# Patient Record
Sex: FEMALE | Race: OTHER | HISPANIC OR LATINO | URBAN - METROPOLITAN AREA
[De-identification: names, ages, dates, MRNs, and addresses within clinical notes are randomized per-mention and may not be internally consistent; named-entity substitution may affect disease eponyms.]

---

## 2019-03-24 ENCOUNTER — EMERGENCY (EMERGENCY)
Facility: HOSPITAL | Age: 29
LOS: 1 days | Discharge: HOME | End: 2019-03-24
Attending: EMERGENCY MEDICINE
Payer: MEDICAID

## 2019-03-24 VITALS
OXYGEN SATURATION: 100 % | HEART RATE: 86 BPM | SYSTOLIC BLOOD PRESSURE: 103 MMHG | RESPIRATION RATE: 18 BRPM | TEMPERATURE: 98 F | DIASTOLIC BLOOD PRESSURE: 59 MMHG | HEIGHT: 65 IN | WEIGHT: 125 LBS

## 2019-03-24 LAB
ALBUMIN SERPL ELPH-MCNC: 4.5 G/DL — SIGNIFICANT CHANGE UP (ref 3.3–5.2)
ALP SERPL-CCNC: 66 U/L — SIGNIFICANT CHANGE UP (ref 40–120)
ALT FLD-CCNC: 7 U/L — SIGNIFICANT CHANGE UP
ANION GAP SERPL CALC-SCNC: 12 MMOL/L — SIGNIFICANT CHANGE UP (ref 5–17)
APPEARANCE UR: CLEAR — SIGNIFICANT CHANGE UP
AST SERPL-CCNC: 10 U/L — SIGNIFICANT CHANGE UP
BASOPHILS # BLD AUTO: 0 K/UL — SIGNIFICANT CHANGE UP (ref 0–0.2)
BASOPHILS NFR BLD AUTO: 0.2 % — SIGNIFICANT CHANGE UP (ref 0–2)
BILIRUB SERPL-MCNC: 0.4 MG/DL — SIGNIFICANT CHANGE UP (ref 0.4–2)
BILIRUB UR-MCNC: NEGATIVE — SIGNIFICANT CHANGE UP
BUN SERPL-MCNC: 5 MG/DL — LOW (ref 8–20)
CALCIUM SERPL-MCNC: 9.4 MG/DL — SIGNIFICANT CHANGE UP (ref 8.6–10.2)
CHLORIDE SERPL-SCNC: 102 MMOL/L — SIGNIFICANT CHANGE UP (ref 98–107)
CO2 SERPL-SCNC: 23 MMOL/L — SIGNIFICANT CHANGE UP (ref 22–29)
COLOR SPEC: YELLOW — SIGNIFICANT CHANGE UP
CREAT SERPL-MCNC: 0.42 MG/DL — LOW (ref 0.5–1.3)
DIFF PNL FLD: ABNORMAL
EOSINOPHIL # BLD AUTO: 0.2 K/UL — SIGNIFICANT CHANGE UP (ref 0–0.5)
EOSINOPHIL NFR BLD AUTO: 1.5 % — SIGNIFICANT CHANGE UP (ref 0–6)
GLUCOSE SERPL-MCNC: 74 MG/DL — SIGNIFICANT CHANGE UP (ref 70–115)
GLUCOSE UR QL: NEGATIVE MG/DL — SIGNIFICANT CHANGE UP
HCG SERPL-ACNC: HIGH MIU/ML
HCG UR QL: POSITIVE
HCT VFR BLD CALC: 35.2 % — LOW (ref 37–47)
HGB BLD-MCNC: 12.4 G/DL — SIGNIFICANT CHANGE UP (ref 12–16)
KETONES UR-MCNC: NEGATIVE — SIGNIFICANT CHANGE UP
LEUKOCYTE ESTERASE UR-ACNC: ABNORMAL
LYMPHOCYTES # BLD AUTO: 2.8 K/UL — SIGNIFICANT CHANGE UP (ref 1–4.8)
LYMPHOCYTES # BLD AUTO: 23.8 % — SIGNIFICANT CHANGE UP (ref 20–55)
MCHC RBC-ENTMCNC: 30.9 PG — SIGNIFICANT CHANGE UP (ref 27–31)
MCHC RBC-ENTMCNC: 35.2 G/DL — SIGNIFICANT CHANGE UP (ref 32–36)
MCV RBC AUTO: 87.8 FL — SIGNIFICANT CHANGE UP (ref 81–99)
MONOCYTES # BLD AUTO: 1 K/UL — HIGH (ref 0–0.8)
MONOCYTES NFR BLD AUTO: 8.4 % — SIGNIFICANT CHANGE UP (ref 3–10)
NEUTROPHILS # BLD AUTO: 7.6 K/UL — SIGNIFICANT CHANGE UP (ref 1.8–8)
NEUTROPHILS NFR BLD AUTO: 65.8 % — SIGNIFICANT CHANGE UP (ref 37–73)
NITRITE UR-MCNC: NEGATIVE — SIGNIFICANT CHANGE UP
PH UR: 7 — SIGNIFICANT CHANGE UP (ref 5–8)
PLATELET # BLD AUTO: 286 K/UL — SIGNIFICANT CHANGE UP (ref 150–400)
POTASSIUM SERPL-MCNC: 4.1 MMOL/L — SIGNIFICANT CHANGE UP (ref 3.5–5.3)
POTASSIUM SERPL-SCNC: 4.1 MMOL/L — SIGNIFICANT CHANGE UP (ref 3.5–5.3)
PROT SERPL-MCNC: 8.1 G/DL — SIGNIFICANT CHANGE UP (ref 6.6–8.7)
PROT UR-MCNC: 15 MG/DL
RBC # BLD: 4.01 M/UL — LOW (ref 4.4–5.2)
RBC # FLD: 12.5 % — SIGNIFICANT CHANGE UP (ref 11–15.6)
SODIUM SERPL-SCNC: 137 MMOL/L — SIGNIFICANT CHANGE UP (ref 135–145)
SP GR SPEC: 1 — LOW (ref 1.01–1.02)
TYPE + AB SCN PNL BLD: SIGNIFICANT CHANGE UP
UROBILINOGEN FLD QL: NEGATIVE MG/DL — SIGNIFICANT CHANGE UP
WBC # BLD: 11.6 K/UL — HIGH (ref 4.8–10.8)
WBC # FLD AUTO: 11.6 K/UL — HIGH (ref 4.8–10.8)

## 2019-03-24 PROCEDURE — 76801 OB US < 14 WKS SINGLE FETUS: CPT

## 2019-03-24 PROCEDURE — 86850 RBC ANTIBODY SCREEN: CPT

## 2019-03-24 PROCEDURE — 36415 COLL VENOUS BLD VENIPUNCTURE: CPT

## 2019-03-24 PROCEDURE — 81001 URINALYSIS AUTO W/SCOPE: CPT

## 2019-03-24 PROCEDURE — 99284 EMERGENCY DEPT VISIT MOD MDM: CPT

## 2019-03-24 PROCEDURE — 80053 COMPREHEN METABOLIC PANEL: CPT

## 2019-03-24 PROCEDURE — 84702 CHORIONIC GONADOTROPIN TEST: CPT

## 2019-03-24 PROCEDURE — 76801 OB US < 14 WKS SINGLE FETUS: CPT | Mod: 26

## 2019-03-24 PROCEDURE — 86901 BLOOD TYPING SEROLOGIC RH(D): CPT

## 2019-03-24 PROCEDURE — 93010 ELECTROCARDIOGRAM REPORT: CPT

## 2019-03-24 PROCEDURE — 86900 BLOOD TYPING SEROLOGIC ABO: CPT

## 2019-03-24 PROCEDURE — 81025 URINE PREGNANCY TEST: CPT

## 2019-03-24 PROCEDURE — 93005 ELECTROCARDIOGRAM TRACING: CPT

## 2019-03-24 PROCEDURE — 85027 COMPLETE CBC AUTOMATED: CPT

## 2019-03-24 PROCEDURE — T1013: CPT

## 2019-03-24 RX ORDER — ACETAMINOPHEN 500 MG
650 TABLET ORAL ONCE
Qty: 0 | Refills: 0 | Status: COMPLETED | OUTPATIENT
Start: 2019-03-24 | End: 2019-03-24

## 2019-03-24 RX ADMIN — Medication 650 MILLIGRAM(S): at 19:40

## 2019-03-24 NOTE — ED STATDOCS - CLINICAL SUMMARY MEDICAL DECISION MAKING FREE TEXT BOX
Pregnant F, , abdominal pain, with suprapubic tenderness on exma, with slight dysuria, check UA and r/o UTI, obtain labs including HCG, and US to confirm IUP. Pregnant F, , abdominal pain, with suprapubic tenderness on exam. Mild dysuria reported will check UA and r/o UTI, obtain labs including HCG, and US to confirm IUP.

## 2019-03-24 NOTE — ED STATDOCS - PROGRESS NOTE DETAILS
RADHA SANDOVAL: PT evaluated by intake physician. HPI/PE/ROS as noted above. Will follow up plan per intake physician   educated on results of sono and blood work and proper fu and percautions for abdominal pain during pregnancy.   pending EKG EKG normal DC with FU with [facility]

## 2019-03-24 NOTE — ED STATDOCS - NS_ ATTENDINGSCRIBEDETAILS _ED_A_ED_FT
I, Taya Moncada, performed the initial face to face bedside interview with this patient regarding history of present illness, review of symptoms and relevant past medical, social and family history.  I completed an independent physical examination.  I was the provider who initially evaluated this patient.  The history, relevant review of systems, past medical and surgical history, medical decision making, and physical examination was documented by the scribe in my presence and I attest to the accuracy of the documentation. Follow-up on ordered tests (ie labs, radiologic studies) and re-evaluation of the patient's status has been communicated to the ACP.  Disposition of the patient will be based on test outcome and response to ED interventions.

## 2019-03-24 NOTE — ED STATDOCS - OBJECTIVE STATEMENT
29 y/o F pt with no pert. hx presents to the ED c/o constant lower abdominal pain with assoc. subjective fever, dysuria, nausea, chills, and decreased PO intake for 3 days. Pt notes chest discomfort when she moves from lying down to sitting up. Pt rates her pain 9/10. Pt is currently pregnant, has not seen an OB/GYN. A0. Pt's last BM was yesterday. Pt [place] came into the [place] in [place]. Pt hasn't been taking any medication for her pain. Denies vaginal bleeding and hematuria. No further complaints at this time.   PMD: None  LMP:    : Paulina

## 2019-04-10 PROBLEM — Z00.00 ENCOUNTER FOR PREVENTIVE HEALTH EXAMINATION: Status: ACTIVE | Noted: 2019-04-10

## 2019-04-25 ENCOUNTER — APPOINTMENT (OUTPATIENT)
Age: 29
End: 2019-04-25
Payer: MEDICAID

## 2019-04-25 ENCOUNTER — ASOB RESULT (OUTPATIENT)
Age: 29
End: 2019-04-25

## 2019-04-25 PROCEDURE — 76813 OB US NUCHAL MEAS 1 GEST: CPT

## 2019-04-25 PROCEDURE — ZZZZZ: CPT

## 2019-04-25 PROCEDURE — 76801 OB US < 14 WKS SINGLE FETUS: CPT

## 2019-04-25 PROCEDURE — 36416 COLLJ CAPILLARY BLOOD SPEC: CPT

## 2019-04-29 LAB
1ST TRIMESTER DATA: NORMAL
ADDENDUM DOC: NORMAL
AFP PNL SERPL: NORMAL
AFP SERPL-ACNC: NORMAL
CLINICAL BIOCHEMIST REVIEW: ABNORMAL
FREE BETA HCG 1ST TRIMESTER: NORMAL
Lab: NORMAL
NOTES NTD: NORMAL
NT: NORMAL
PAPP-A SERPL-ACNC: NORMAL
TRISOMY 18/3: NORMAL

## 2019-06-03 ENCOUNTER — APPOINTMENT (OUTPATIENT)
Dept: ANTEPARTUM | Facility: CLINIC | Age: 29
End: 2019-06-03
Payer: MEDICAID

## 2019-06-03 ENCOUNTER — ASOB RESULT (OUTPATIENT)
Age: 29
End: 2019-06-03

## 2019-06-03 ENCOUNTER — APPOINTMENT (OUTPATIENT)
Dept: MATERNAL FETAL MEDICINE | Facility: CLINIC | Age: 29
End: 2019-06-03
Payer: MEDICAID

## 2019-06-03 VITALS
RESPIRATION RATE: 16 BRPM | HEIGHT: 62 IN | DIASTOLIC BLOOD PRESSURE: 56 MMHG | HEART RATE: 71 BPM | SYSTOLIC BLOOD PRESSURE: 90 MMHG | BODY MASS INDEX: 21.44 KG/M2 | OXYGEN SATURATION: 98 % | WEIGHT: 116.5 LBS

## 2019-06-03 DIAGNOSIS — Z3A.17 17 WEEKS GESTATION OF PREGNANCY: ICD-10-CM

## 2019-06-03 DIAGNOSIS — O28.0 ABNORMAL HEMATOLOGICAL FINDING ON ANTENATAL SCREENING OF MOTHER: ICD-10-CM

## 2019-06-03 DIAGNOSIS — O09.899 ABNORMAL HEMATOLOGICAL FINDING ON ANTENATAL SCREENING OF MOTHER: ICD-10-CM

## 2019-06-03 DIAGNOSIS — O28.9 UNSPECIFIED ABNORMAL FINDINGS ON ANTENATAL SCREENING OF MOTHER: ICD-10-CM

## 2019-06-03 DIAGNOSIS — J30.1 ALLERGIC RHINITIS DUE TO POLLEN: ICD-10-CM

## 2019-06-03 DIAGNOSIS — Z83.3 FAMILY HISTORY OF DIABETES MELLITUS: ICD-10-CM

## 2019-06-03 PROCEDURE — 99205 OFFICE O/P NEW HI 60 MIN: CPT | Mod: TH

## 2019-06-03 PROCEDURE — 76805 OB US >/= 14 WKS SNGL FETUS: CPT

## 2019-06-03 RX ORDER — VITAMIN C, CALCIUM, IRON, VITAMIN D3, VITAMIN E, VITAMIN B1, VITAMIN B2, VITAMIN B3, VITAMIN B6, FOLIC ACID, IODINE, ZINC, COPPER, DOCUSATE SODIUM, DOCOSAHEXAENOIC ACID (DHA) 27-1-50 MG
KIT ORAL
Refills: 0 | Status: ACTIVE | COMMUNITY

## 2019-06-03 NOTE — DISCUSSION/SUMMARY
[FreeTextEntry1] : Level 2 sonogram is scheduled in 3 weeks. \par \par Genetic counseling is manuel scheduled to review the results of her genetic screening.

## 2019-06-03 NOTE — OB HISTORY
[Pregnancy History] : patient did not receive anesthesia [___] : no pregnancy complications reported [LMP: ___] : LMP: [unfilled] [BRYAN: ___] : BRYAN: [unfilled] [EGA: ___ wks] : EGA: [unfilled] wks [at ___ wks] : at [unfilled] weeks [Spontaneous] : Spontaneous conception [Sonogram] : sonogram [Definite:  ___ (Date)] : the last menstrual period was [unfilled]

## 2019-06-03 NOTE — PAST MEDICAL HISTORY
[HIV Infection] : no HIV [Syphilis] : no syphilis [Exposure To Gonorrhea] : no gonorrhea [Chlamydial Infections] : no chlamydia [Herpes Simplex] : no genital herpes [Human Papilloma Virus Infection] : no genital warts [Hepatitis, B Virus] : no Hepatitis B [Trichomoniasis] : no trichomoniasis [Hepatitis, C Virus] : no Hepatitis C

## 2019-06-03 NOTE — OB HISTORY
[Pregnancy History] : patient did not receive anesthesia [___] : no pregnancy complications reported [LMP: ___] : LMP: [unfilled] [BRYAN: ___] : BRYAN: [unfilled] [EGA: ___ wks] : EGA: [unfilled] wks [Spontaneous] : Spontaneous conception [Sonogram] : sonogram [at ___ wks] : at [unfilled] weeks [Definite:  ___ (Date)] : the last menstrual period was [unfilled]

## 2019-06-03 NOTE — CHIEF COMPLAINT
[G ___] : G [unfilled] [P ___] : P [unfilled] [de-identified] : low JACKY A and increased risk for Down Syndrome on first trimester screen

## 2019-06-03 NOTE — SURGICAL HISTORY
[Breast Disease] : no breast disease [Abn Paps] : no abnormal pap smears [Fibroids] : no fibroids [STI's] : no STI's [Cysts] : no cysts [Infertility] : no infertility [Last Pap: ___] : Last Pap: [unfilled] [OC Use] : no OC use [Last Mammo: ___] : Last Mammo: [unfilled]

## 2019-06-03 NOTE — VITALS
[LMP (date): ___] : LMP was on [unfilled] [GA =___ Weeks] : which calculates to a GA of [unfilled] weeks [GA= ___ Days] : and [unfilled] day(s) [BRYAN by LMP (date): ___] : The calculated BRYAN by LMP is [unfilled] [By LMP] : this is the final BRYAN

## 2019-06-03 NOTE — ACTIVE PROBLEMS
[Diabetes Mellitus] : no diabetes mellitus [Heart Disease] : no heart disease [Hypertension] : no hypertension [Renal Disease] : no kidney disease, no UTI [Autoimmune Disease] : no autoimmune disease [Neurologic Disorder] : no neurologic disorder, no epilepsy [Psychiatric Disorders] : no psychiatric disorders [Depression] : no depression, no post partum depression [Hepatic Disorder] : no hepatitis, no liver disease [Thyroid Disorder] : no thyroid dysfunction [Thrombophlebitis] : no varicosities, no phlebitis [Trauma] : no trauma/violence [Blood Transfusion (___ Ml)] : no history of blood transfusion

## 2019-06-03 NOTE — FAMILY HISTORY
[Age 35+ During Pregnancy] : not 35 or over during pregnancy [Reported Family History Of Birth Defects] : no neural tube defect [Alex-Sachs Carrier] : no Alex-Sachs [Family History] : no mental retardation/autism [Reported Family History Of Genetic Disease] : no history of child defect in child of baby father

## 2019-06-03 NOTE — ACTIVE PROBLEMS
[Diabetes Mellitus] : no diabetes mellitus [Heart Disease] : no heart disease [Hypertension] : no hypertension [Autoimmune Disease] : no autoimmune disease [Renal Disease] : no kidney disease, no UTI [Neurologic Disorder] : no neurologic disorder, no epilepsy [Depression] : no depression, no post partum depression [Psychiatric Disorders] : no psychiatric disorders [Thyroid Disorder] : no thyroid dysfunction [Thrombophlebitis] : no varicosities, no phlebitis [Trauma] : no trauma/violence [Hepatic Disorder] : no hepatitis, no liver disease [Blood Transfusion (___ Ml)] : no history of blood transfusion

## 2019-06-03 NOTE — DATA REVIEWED
[FreeTextEntry1] : Mita reports she has not had genetic counseling, however states she had a blood test that told her the genetics are normal and this is a male fetus. We could not find any record of a NIPS test being performed. \par \par Mita was counseled regarding the risks of JACKY A at the 1%, including increased risks of low birth weight,  delivery, fetal loss, and preeclampsia. \par \par She understands the need for incresed monitoring and will begin baby aspirin therapy.

## 2019-06-03 NOTE — CHIEF COMPLAINT
[G ___] : G [unfilled] [P ___] : P [unfilled] [de-identified] : low JACKY A and increased risk for Down Syndrome on first trimester screen

## 2019-06-03 NOTE — SURGICAL HISTORY
[Fibroids] : no fibroids [Abn Paps] : no abnormal pap smears [Breast Disease] : no breast disease [Cysts] : no cysts [STI's] : no STI's [Infertility] : no infertility [OC Use] : no OC use [Last Pap: ___] : Last Pap: [unfilled] [Last Mammo: ___] : Last Mammo: [unfilled]

## 2019-06-03 NOTE — PAST MEDICAL HISTORY
[HIV Infection] : no HIV [Chlamydial Infections] : no chlamydia [Syphilis] : no syphilis [Exposure To Gonorrhea] : no gonorrhea [Human Papilloma Virus Infection] : no genital warts [Hepatitis, B Virus] : no Hepatitis B [Herpes Simplex] : no genital herpes [Trichomoniasis] : no trichomoniasis [Hepatitis, C Virus] : no Hepatitis C

## 2019-06-03 NOTE — FAMILY HISTORY
[Age 35+ During Pregnancy] : not 35 or over during pregnancy [Reported Family History Of Birth Defects] : no congenital heart defects [Alex-Sachs Carrier] : no Alex-Sachs [Family History] : no mental retardation/autism [Reported Family History Of Genetic Disease] : no history of child defect in child of baby father

## 2019-06-03 NOTE — VITALS
[GA =___ Weeks] : which calculates to a GA of [unfilled] weeks [LMP (date): ___] : LMP was on [unfilled] [GA= ___ Days] : and [unfilled] day(s) [BRYAN by LMP (date): ___] : The calculated BRYAN by LMP is [unfilled] [By LMP] : this is the final BRYAN

## 2019-06-11 ENCOUNTER — ASOB RESULT (OUTPATIENT)
Age: 29
End: 2019-06-11

## 2019-06-11 ENCOUNTER — APPOINTMENT (OUTPATIENT)
Dept: MATERNAL FETAL MEDICINE | Facility: CLINIC | Age: 29
End: 2019-06-11
Payer: MEDICAID

## 2019-06-11 DIAGNOSIS — O35.1XX0 MATERNAL CARE FOR (SUSPECTED) CHROMOSOMAL ABNORMALITY IN FETUS, NOT APPLICABLE OR UNSPECIFIED: ICD-10-CM

## 2019-06-11 PROCEDURE — 99213 OFFICE O/P EST LOW 20 MIN: CPT | Mod: TH

## 2019-06-15 LAB
1ST TRIMESTER DATA: NORMAL
2ND TRIMESTER DATA: NORMAL
ADDENDUM DOC: NORMAL
AFP PNL SERPL: NORMAL
AFP SERPL-ACNC: NORMAL
AFP SERPL-ACNC: NORMAL
B-HCG FREE SERPL-MCNC: NORMAL
CLINICAL BIOCHEMIST REVIEW: NORMAL
FREE BETA HCG 1ST TRIMESTER: NORMAL
INHIBIN A SERPL-MCNC: NORMAL
NOTES NTD: NORMAL
NT: NORMAL
PAPP-A SERPL-ACNC: NORMAL
U ESTRIOL SERPL-SCNC: NORMAL

## 2019-06-19 ENCOUNTER — APPOINTMENT (OUTPATIENT)
Age: 29
End: 2019-06-19
Payer: MEDICAID

## 2019-06-19 ENCOUNTER — ASOB RESULT (OUTPATIENT)
Age: 29
End: 2019-06-19

## 2019-06-19 PROCEDURE — 76811 OB US DETAILED SNGL FETUS: CPT

## 2019-06-19 PROCEDURE — 76817 TRANSVAGINAL US OBSTETRIC: CPT

## 2019-07-01 ENCOUNTER — APPOINTMENT (OUTPATIENT)
Dept: MATERNAL FETAL MEDICINE | Facility: CLINIC | Age: 29
End: 2019-07-01

## 2019-07-01 ENCOUNTER — APPOINTMENT (OUTPATIENT)
Dept: ANTEPARTUM | Facility: CLINIC | Age: 29
End: 2019-07-01

## 2019-07-17 ENCOUNTER — APPOINTMENT (OUTPATIENT)
Dept: ANTEPARTUM | Facility: CLINIC | Age: 29
End: 2019-07-17
Payer: MEDICAID

## 2019-07-17 ENCOUNTER — ASOB RESULT (OUTPATIENT)
Age: 29
End: 2019-07-17

## 2019-07-17 PROCEDURE — 76820 UMBILICAL ARTERY ECHO: CPT

## 2019-07-17 PROCEDURE — 93976 VASCULAR STUDY: CPT

## 2019-07-17 PROCEDURE — 76816 OB US FOLLOW-UP PER FETUS: CPT

## 2019-08-14 ENCOUNTER — APPOINTMENT (OUTPATIENT)
Dept: ANTEPARTUM | Facility: CLINIC | Age: 29
End: 2019-08-14
Payer: MEDICAID

## 2019-08-14 ENCOUNTER — ASOB RESULT (OUTPATIENT)
Age: 29
End: 2019-08-14

## 2019-08-14 PROCEDURE — 76820 UMBILICAL ARTERY ECHO: CPT

## 2019-08-14 PROCEDURE — 93976 VASCULAR STUDY: CPT

## 2019-08-14 PROCEDURE — 76816 OB US FOLLOW-UP PER FETUS: CPT

## 2019-09-11 ENCOUNTER — APPOINTMENT (OUTPATIENT)
Dept: ANTEPARTUM | Facility: CLINIC | Age: 29
End: 2019-09-11
Payer: MEDICAID

## 2019-09-11 ENCOUNTER — ASOB RESULT (OUTPATIENT)
Age: 29
End: 2019-09-11

## 2019-09-11 PROCEDURE — 76816 OB US FOLLOW-UP PER FETUS: CPT

## 2019-09-11 PROCEDURE — 76819 FETAL BIOPHYS PROFIL W/O NST: CPT

## 2019-09-11 PROCEDURE — 76820 UMBILICAL ARTERY ECHO: CPT

## 2019-09-11 PROCEDURE — 93976 VASCULAR STUDY: CPT

## 2019-10-09 ENCOUNTER — APPOINTMENT (OUTPATIENT)
Dept: ANTEPARTUM | Facility: CLINIC | Age: 29
End: 2019-10-09
Payer: MEDICAID

## 2019-10-09 ENCOUNTER — ASOB RESULT (OUTPATIENT)
Age: 29
End: 2019-10-09

## 2019-10-09 PROCEDURE — 76816 OB US FOLLOW-UP PER FETUS: CPT

## 2019-11-04 ENCOUNTER — TRANSCRIPTION ENCOUNTER (OUTPATIENT)
Age: 29
End: 2019-11-04

## 2019-11-04 ENCOUNTER — INPATIENT (INPATIENT)
Facility: HOSPITAL | Age: 29
LOS: 1 days | Discharge: HOME | End: 2019-11-06
Attending: OBSTETRICS & GYNECOLOGY | Admitting: OBSTETRICS & GYNECOLOGY
Payer: COMMERCIAL

## 2019-11-04 VITALS — DIASTOLIC BLOOD PRESSURE: 76 MMHG | SYSTOLIC BLOOD PRESSURE: 109 MMHG | HEART RATE: 89 BPM

## 2019-11-04 DIAGNOSIS — Z3A.39 39 WEEKS GESTATION OF PREGNANCY: ICD-10-CM

## 2019-11-04 DIAGNOSIS — O47.1 FALSE LABOR AT OR AFTER 37 COMPLETED WEEKS OF GESTATION: ICD-10-CM

## 2019-11-04 LAB
APPEARANCE UR: CLEAR — SIGNIFICANT CHANGE UP
BACTERIA # UR AUTO: ABNORMAL
BASOPHILS # BLD AUTO: 0.04 K/UL — SIGNIFICANT CHANGE UP (ref 0–0.2)
BASOPHILS NFR BLD AUTO: 0.3 % — SIGNIFICANT CHANGE UP (ref 0–2)
BILIRUB UR-MCNC: NEGATIVE — SIGNIFICANT CHANGE UP
BLD GP AB SCN SERPL QL: SIGNIFICANT CHANGE UP
COLOR SPEC: YELLOW — SIGNIFICANT CHANGE UP
DIFF PNL FLD: NEGATIVE — SIGNIFICANT CHANGE UP
EOSINOPHIL # BLD AUTO: 0.17 K/UL — SIGNIFICANT CHANGE UP (ref 0–0.5)
EOSINOPHIL NFR BLD AUTO: 1.4 % — SIGNIFICANT CHANGE UP (ref 0–6)
EPI CELLS # UR: SIGNIFICANT CHANGE UP
GLUCOSE UR QL: NEGATIVE MG/DL — SIGNIFICANT CHANGE UP
HCT VFR BLD CALC: 34 % — LOW (ref 34.5–45)
HGB BLD-MCNC: 11.2 G/DL — LOW (ref 11.5–15.5)
IMM GRANULOCYTES NFR BLD AUTO: 0.8 % — SIGNIFICANT CHANGE UP (ref 0–1.5)
KETONES UR-MCNC: ABNORMAL
LEUKOCYTE ESTERASE UR-ACNC: NEGATIVE — SIGNIFICANT CHANGE UP
LYMPHOCYTES # BLD AUTO: 19.6 % — SIGNIFICANT CHANGE UP (ref 13–44)
LYMPHOCYTES # BLD AUTO: 2.37 K/UL — SIGNIFICANT CHANGE UP (ref 1–3.3)
MCHC RBC-ENTMCNC: 28.7 PG — SIGNIFICANT CHANGE UP (ref 27–34)
MCHC RBC-ENTMCNC: 32.9 GM/DL — SIGNIFICANT CHANGE UP (ref 32–36)
MCV RBC AUTO: 87.2 FL — SIGNIFICANT CHANGE UP (ref 80–100)
MONOCYTES # BLD AUTO: 0.66 K/UL — SIGNIFICANT CHANGE UP (ref 0–0.9)
MONOCYTES NFR BLD AUTO: 5.5 % — SIGNIFICANT CHANGE UP (ref 2–14)
NEUTROPHILS # BLD AUTO: 8.75 K/UL — HIGH (ref 1.8–7.4)
NEUTROPHILS NFR BLD AUTO: 72.4 % — SIGNIFICANT CHANGE UP (ref 43–77)
NITRITE UR-MCNC: NEGATIVE — SIGNIFICANT CHANGE UP
PH UR: 6 — SIGNIFICANT CHANGE UP (ref 5–8)
PLATELET # BLD AUTO: 318 K/UL — SIGNIFICANT CHANGE UP (ref 150–400)
PROT UR-MCNC: 15 MG/DL
RBC # BLD: 3.9 M/UL — SIGNIFICANT CHANGE UP (ref 3.8–5.2)
RBC # FLD: 13.2 % — SIGNIFICANT CHANGE UP (ref 10.3–14.5)
RBC CASTS # UR COMP ASSIST: SIGNIFICANT CHANGE UP /HPF (ref 0–4)
SP GR SPEC: 1.01 — SIGNIFICANT CHANGE UP (ref 1.01–1.02)
T PALLIDUM AB TITR SER: NEGATIVE — SIGNIFICANT CHANGE UP
UROBILINOGEN FLD QL: 1 MG/DL
WBC # BLD: 12.09 K/UL — HIGH (ref 3.8–10.5)
WBC # FLD AUTO: 12.09 K/UL — HIGH (ref 3.8–10.5)
WBC UR QL: SIGNIFICANT CHANGE UP

## 2019-11-04 RX ORDER — CITRIC ACID/SODIUM CITRATE 300-500 MG
30 SOLUTION, ORAL ORAL ONCE
Refills: 0 | Status: COMPLETED | OUTPATIENT
Start: 2019-11-04 | End: 2019-11-04

## 2019-11-04 RX ORDER — DIBUCAINE 1 %
1 OINTMENT (GRAM) RECTAL EVERY 6 HOURS
Refills: 0 | Status: DISCONTINUED | OUTPATIENT
Start: 2019-11-04 | End: 2019-11-06

## 2019-11-04 RX ORDER — LANOLIN
1 OINTMENT (GRAM) TOPICAL EVERY 6 HOURS
Refills: 0 | Status: DISCONTINUED | OUTPATIENT
Start: 2019-11-04 | End: 2019-11-06

## 2019-11-04 RX ORDER — OXYTOCIN 10 UNIT/ML
333.33 VIAL (ML) INJECTION
Qty: 20 | Refills: 0 | Status: DISCONTINUED | OUTPATIENT
Start: 2019-11-04 | End: 2019-11-06

## 2019-11-04 RX ORDER — OXYCODONE HYDROCHLORIDE 5 MG/1
5 TABLET ORAL
Refills: 0 | Status: DISCONTINUED | OUTPATIENT
Start: 2019-11-04 | End: 2019-11-06

## 2019-11-04 RX ORDER — OXYCODONE HYDROCHLORIDE 5 MG/1
5 TABLET ORAL ONCE
Refills: 0 | Status: DISCONTINUED | OUTPATIENT
Start: 2019-11-04 | End: 2019-11-06

## 2019-11-04 RX ORDER — AMPICILLIN TRIHYDRATE 250 MG
2 CAPSULE ORAL ONCE
Refills: 0 | Status: COMPLETED | OUTPATIENT
Start: 2019-11-04 | End: 2019-11-04

## 2019-11-04 RX ORDER — SIMETHICONE 80 MG/1
80 TABLET, CHEWABLE ORAL EVERY 4 HOURS
Refills: 0 | Status: DISCONTINUED | OUTPATIENT
Start: 2019-11-04 | End: 2019-11-06

## 2019-11-04 RX ORDER — HYDROCORTISONE 1 %
1 OINTMENT (GRAM) TOPICAL EVERY 6 HOURS
Refills: 0 | Status: DISCONTINUED | OUTPATIENT
Start: 2019-11-04 | End: 2019-11-06

## 2019-11-04 RX ORDER — SODIUM CHLORIDE 9 MG/ML
1000 INJECTION, SOLUTION INTRAVENOUS ONCE
Refills: 0 | Status: COMPLETED | OUTPATIENT
Start: 2019-11-04 | End: 2019-11-04

## 2019-11-04 RX ORDER — AMPICILLIN TRIHYDRATE 250 MG
1 CAPSULE ORAL EVERY 4 HOURS
Refills: 0 | Status: DISCONTINUED | OUTPATIENT
Start: 2019-11-04 | End: 2019-11-04

## 2019-11-04 RX ORDER — MAGNESIUM HYDROXIDE 400 MG/1
30 TABLET, CHEWABLE ORAL
Refills: 0 | Status: DISCONTINUED | OUTPATIENT
Start: 2019-11-04 | End: 2019-11-06

## 2019-11-04 RX ORDER — SODIUM CHLORIDE 9 MG/ML
3 INJECTION INTRAMUSCULAR; INTRAVENOUS; SUBCUTANEOUS EVERY 8 HOURS
Refills: 0 | Status: DISCONTINUED | OUTPATIENT
Start: 2019-11-04 | End: 2019-11-06

## 2019-11-04 RX ORDER — ACETAMINOPHEN 500 MG
975 TABLET ORAL
Refills: 0 | Status: DISCONTINUED | OUTPATIENT
Start: 2019-11-04 | End: 2019-11-06

## 2019-11-04 RX ORDER — BENZOCAINE 10 %
1 GEL (GRAM) MUCOUS MEMBRANE EVERY 6 HOURS
Refills: 0 | Status: DISCONTINUED | OUTPATIENT
Start: 2019-11-04 | End: 2019-11-06

## 2019-11-04 RX ORDER — SODIUM CHLORIDE 9 MG/ML
1000 INJECTION, SOLUTION INTRAVENOUS
Refills: 0 | Status: DISCONTINUED | OUTPATIENT
Start: 2019-11-04 | End: 2019-11-04

## 2019-11-04 RX ORDER — KETOROLAC TROMETHAMINE 30 MG/ML
30 SYRINGE (ML) INJECTION ONCE
Refills: 0 | Status: DISCONTINUED | OUTPATIENT
Start: 2019-11-04 | End: 2019-11-04

## 2019-11-04 RX ORDER — GLYCERIN ADULT
1 SUPPOSITORY, RECTAL RECTAL AT BEDTIME
Refills: 0 | Status: DISCONTINUED | OUTPATIENT
Start: 2019-11-04 | End: 2019-11-06

## 2019-11-04 RX ORDER — TETANUS TOXOID, REDUCED DIPHTHERIA TOXOID AND ACELLULAR PERTUSSIS VACCINE, ADSORBED 5; 2.5; 8; 8; 2.5 [IU]/.5ML; [IU]/.5ML; UG/.5ML; UG/.5ML; UG/.5ML
0.5 SUSPENSION INTRAMUSCULAR ONCE
Refills: 0 | Status: DISCONTINUED | OUTPATIENT
Start: 2019-11-04 | End: 2019-11-06

## 2019-11-04 RX ORDER — AER TRAVELER 0.5 G/1
1 SOLUTION RECTAL; TOPICAL EVERY 4 HOURS
Refills: 0 | Status: DISCONTINUED | OUTPATIENT
Start: 2019-11-04 | End: 2019-11-06

## 2019-11-04 RX ORDER — DIPHENHYDRAMINE HCL 50 MG
25 CAPSULE ORAL EVERY 6 HOURS
Refills: 0 | Status: DISCONTINUED | OUTPATIENT
Start: 2019-11-04 | End: 2019-11-06

## 2019-11-04 RX ORDER — PRAMOXINE HYDROCHLORIDE 150 MG/15G
1 AEROSOL, FOAM RECTAL EVERY 4 HOURS
Refills: 0 | Status: DISCONTINUED | OUTPATIENT
Start: 2019-11-04 | End: 2019-11-06

## 2019-11-04 RX ORDER — IBUPROFEN 200 MG
600 TABLET ORAL EVERY 6 HOURS
Refills: 0 | Status: COMPLETED | OUTPATIENT
Start: 2019-11-04 | End: 2020-10-02

## 2019-11-04 RX ADMIN — Medication 30 MILLIGRAM(S): at 08:17

## 2019-11-04 RX ADMIN — Medication 975 MILLIGRAM(S): at 21:30

## 2019-11-04 RX ADMIN — Medication 1000 MILLIUNIT(S)/MIN: at 06:20

## 2019-11-04 RX ADMIN — SODIUM CHLORIDE 1000 MILLILITER(S): 9 INJECTION, SOLUTION INTRAVENOUS at 03:45

## 2019-11-04 RX ADMIN — Medication 216 GRAM(S): at 04:02

## 2019-11-04 RX ADMIN — SODIUM CHLORIDE 3 MILLILITER(S): 9 INJECTION INTRAMUSCULAR; INTRAVENOUS; SUBCUTANEOUS at 22:00

## 2019-11-04 RX ADMIN — Medication 975 MILLIGRAM(S): at 20:37

## 2019-11-04 RX ADMIN — Medication 30 MILLILITER(S): at 04:23

## 2019-11-04 RX ADMIN — SODIUM CHLORIDE 3 MILLILITER(S): 9 INJECTION INTRAMUSCULAR; INTRAVENOUS; SUBCUTANEOUS at 14:48

## 2019-11-04 RX ADMIN — Medication 30 MILLIGRAM(S): at 08:02

## 2019-11-04 NOTE — DISCHARGE NOTE OB - PATIENT PORTAL LINK FT
You can access the FollowMyHealth Patient Portal offered by [facility] by registering at the following website: [email or web address]. By joining [facility]’s FollowMyHealth portal, you will also be able to view your health information using other applications (apps) compatible with our system.

## 2019-11-04 NOTE — DISCHARGE NOTE OB - CARE PROVIDER_API CALL
Tammie Webster)  Obstetrics and Gynecology  [address] [place] [address]  Phone: [phone]  Fax: [phone]  Follow Up Time:

## 2019-11-04 NOTE — CHART NOTE - NSCHARTNOTEFT_GEN_A_CORE
Labor Progress Note    S: Pt feels well. No complaints s/p epidural placement. Nurse noted significant decrease in blood pressure followed by fetal heart deceleration.   Anesthesia at bedside. Phenylephrine given with improvement of BP.  Resuscitation efforts with fluids and oxygen started.    O:  T(C): 36.9, Max: 36.9 (11-04-19 @ 05:08)  T(F): 98.42, Max: 98.42 (11-04-19 @ 05:08)  HR: 77 (74 - 135)  BP: 96/69 (76/50 - 134/68)  RR: 16 (16 - 16)    FHT: Cat 2  [place]: 5-6mins  SVE: 8/100/-1, intact, vertex, AROM 5:10AM clear, FSE placed     A/P  Continue current management  GBS: positive on antibiotics  Close fetal monitoring   Oxygen with face mask   Expecting vaginal delivery       Darren [id number]  Dr. Webster

## 2019-11-04 NOTE — OB PROVIDER H&P - NSHPPHYSICALEXAM_GEN_ALL_CORE
Vital Signs Last 24 Hrs  HR: 89 (04 Nov 2019 03:23) (89 - 89)  BP: 109/76 (04 Nov 2019 03:23) (109/76 - 109/76)    Sono: cephalic  SVE: 6/100/-2    FHT: baseline 120, moderate variability, + accels, - decels  [place]: ctx q2-3 min

## 2019-11-04 NOTE — DISCHARGE NOTE OB - CARE PLAN
Principal Discharge DX:	 (normal spontaneous vaginal delivery)  Goal:	delivery and recovery  Assessment and plan of treatment:	1) Please take ibuprofen as needed for pain as prescribed.  2) Nothing in the vagina for 6 weeks (including no sex, no tampons, and no douching).  3) Please call your doctor for a follow up your postpartum appointment in 4 weeks.  4) Please continue taking vitamins postpartum. Take iron and colace for acute blood loss anemia.  5) Please call the office sooner if you have heavy vaginal bleeding, severe abdominal pain, or fever > 100.4F.

## 2019-11-04 NOTE — OB RN DELIVERY SUMMARY - NS_SEPSISRSKCALC_OBGYN_ALL_OB_FT
EOS calculated successfully. EOS Risk Factor: 0.5/1000 live births ([facility] national incidence); GA=39w5d; Temp=98.42; ROM=1.017; GBS='Positive'; Antibiotics='No antibiotics or any antibiotics < 2 hrs prior to birth'

## 2019-11-04 NOTE — DISCHARGE NOTE OB - MEDICATION SUMMARY - MEDICATIONS TO TAKE
I will START or STAY ON the medications listed below when I get home from the hospital:    ibuprofen 600 mg oral tablet  -- 1 tab(s) by mouth every 6 hours, As Needed -for mild pain - for moderate pain   -- Indication: For pain control

## 2019-11-04 NOTE — OB PROVIDER DELIVERY SUMMARY - NSPROVIDERDELIVERYNOTE_OBGYN_ALL_OB_FT
Procedure: Normal vaginal delivery  Findings: Viable female infant delivered in cephalic presentation at 06:20   Apgar 9/9. Weight (delayed for skin-to-skin)  Laceration: None  EBL: 100  Complications: None    Procedure:   Patient felt rectal pressure and was found to be fully dilated, +2 station. She pushed effectively for 5 minutes. She delivered a viable MALE  infant. Delayed cord clamping was performed for over 1 minute. Cord gases were collected. Placenta delivered intact. Perineum and vagina were inspected, no lacerations present. Adequate hemostasis was obtained. Baby was admitted to  nursery and mother was safely transferred to Maternity postpartum unit.

## 2019-11-04 NOTE — DISCHARGE NOTE OB - HOSPITAL COURSE
Patient had an uncomplicated vaginal delivery. Hospital course was uneventful. Her pain was well controlled. She is tolerating a regular diet. She is ambulating independently. She was voiding without assistance. Patient with flatus. Labs and Vitals WNL at time of discharge.

## 2019-11-04 NOTE — OB PROVIDER H&P - ATTENDING COMMENTS
30yo  at 39 5/7 weeks  admitted in labor    low chandan-a  lga on prior sono however 3500g on sono today and c/w leopold  abn 1st trim screen - neg NIPS    tracing cat 1    consneted

## 2019-11-04 NOTE — CHART NOTE - NSCHARTNOTEFT_GEN_A_CORE
Labor Progress Note    S: Pt reports rectal pressure with each contraction.     O:  T(C): 36.9, Max: 36.9 (11-04-19 @ 05:08)  T(F): 98.42, Max: 98.42 (11-04-19 @ 05:08)  HR: 85 (74 - 135)  BP: 114/70 (76/50 - 134/68)  RR: 16 (16 - 16)      FHT: baseline 120bpm, moderate variability, no accelerations, variable decelerations with each contraction   [place]: 2-3mins  SVE: 10/100/0 station, ruptured, FSE in place    A/P  On O2 therapy   Will begin pushing  GBS: positive on antibiotics  Expect vaginal delivery     Darren VELÁSQUEZ PGY-3  Dr. Webster

## 2019-11-04 NOTE — DISCHARGE NOTE OB - PLAN OF CARE
delivery and recovery 1) Please take ibuprofen as needed for pain as prescribed.  2) Nothing in the vagina for 6 weeks (including no sex, no tampons, and no douching).  3) Please call your doctor for a follow up your postpartum appointment in 4 weeks.  4) Please continue taking vitamins postpartum. Take iron and colace for acute blood loss anemia.  5) Please call the office sooner if you have heavy vaginal bleeding, severe abdominal pain, or fever > 100.4F.

## 2019-11-04 NOTE — OB PROVIDER H&P - ASSESSMENT
28yo  at 39 5/7 weeks gestation presenting to L&D for contractions beginning at 11PM    -Admit to L&D  -Admission labs sent  -GBS+, will start ampicillin ppx  -Pt requesting epidural for pain management, will contact CRNA when labs result  -Expectant management    Discussed with Dr. Webster

## 2019-11-04 NOTE — OB PROVIDER H&P - HISTORY OF PRESENT ILLNESS
Patient is a 30yo  at 39 5/7 weeks gestation presenting to L&D for contractions beginning at 11PM.    She states the contractions are now every 2-3 minutes and stronger than they were before. She reports good fetal movement. She denies vaginal bleeding and LOF.    This pregnancy has been complicated by:  1. Low JACKY-A  2. Increased risk for T21 on first trimester screen  3. fetal growth in 92nd%ile  4. Anemia    OBHx:  G1- , uncomplicated, [place],   G2- , uncomplicated, [place],   G3- , uncomplicated, 10lbs, [place],   PMH: denies  PSH: denies  Meds: Prenatal vitamins, Iron  All: NKDA Patient is a 28yo  at 39 5/7 weeks gestation presenting to L&D for contractions beginning at 11PM.    She states the contractions are now every 2-3 minutes and stronger than they were before. She reports good fetal movement. She denies vaginal bleeding and LOF.    This pregnancy has been complicated by:  1. Low JACKY-A  2. Increased risk for T21 on first trimester screen, NIPT negative  3. fetal growth in 92nd%ile  4. Anemia    OBHx:  G1- , uncomplicated, [place],   G2- , uncomplicated, [place],   G3- , uncomplicated, 10lbs, [place],   PMH: denies  PSH: denies  Meds: Prenatal vitamins, Iron  All: NKDA

## 2019-11-04 NOTE — OB RN PATIENT PROFILE - ALERT: PERTINENT HISTORY
Non Invasive Prenatal Screen (NIPS)/1st Trimester Sonogram/20 Week Level II Sonogram/Follow up Sonogram for Growth

## 2019-11-05 LAB
HCT VFR BLD CALC: 29.5 % — LOW (ref 34.5–45)
HGB BLD-MCNC: 9.6 G/DL — LOW (ref 11.5–15.5)

## 2019-11-05 RX ORDER — IBUPROFEN 200 MG
600 TABLET ORAL EVERY 6 HOURS
Refills: 0 | Status: DISCONTINUED | OUTPATIENT
Start: 2019-11-05 | End: 2019-11-06

## 2019-11-05 RX ADMIN — Medication 1 TABLET(S): at 12:13

## 2019-11-05 RX ADMIN — Medication 975 MILLIGRAM(S): at 23:30

## 2019-11-05 RX ADMIN — Medication 975 MILLIGRAM(S): at 09:06

## 2019-11-05 RX ADMIN — Medication 975 MILLIGRAM(S): at 09:55

## 2019-11-05 RX ADMIN — Medication 600 MILLIGRAM(S): at 18:06

## 2019-11-05 RX ADMIN — Medication 975 MILLIGRAM(S): at 03:12

## 2019-11-05 RX ADMIN — Medication 600 MILLIGRAM(S): at 13:10

## 2019-11-05 RX ADMIN — Medication 600 MILLIGRAM(S): at 18:49

## 2019-11-05 RX ADMIN — Medication 975 MILLIGRAM(S): at 22:35

## 2019-11-05 RX ADMIN — Medication 975 MILLIGRAM(S): at 04:10

## 2019-11-05 RX ADMIN — SODIUM CHLORIDE 3 MILLILITER(S): 9 INJECTION INTRAMUSCULAR; INTRAVENOUS; SUBCUTANEOUS at 06:00

## 2019-11-05 RX ADMIN — Medication 600 MILLIGRAM(S): at 12:13

## 2019-11-05 NOTE — MEDICAL STUDENT PROGRESS NOTE(EDUCATION) - NS MD HP STUD ASPLAN ASSES FT
A/P: Patient is a 29y  s/p  now PPD1 -- doing well postpartum  - Stable  - Hgb 11.2  - Pain: well controlled on PO pain meds  - GI: Regular diet  - : voiding  - DVT prophylaxis: ambulate  - Dispo: PPD 2, unless otherwise specified Patient is a 29y  s/p  now PPD1 -- doing well postpartum

## 2019-11-05 NOTE — MEDICAL STUDENT PROGRESS NOTE(EDUCATION) - NS MD HP STUD ASPLAN PLAN FT
- Stable  - Hgb 11.2  - Pain: well controlled on PO pain meds  - GI: Regular diet  - : voiding  - DVT prophylaxis: ambulate  - Dispo: PPD 2, unless otherwise specified

## 2019-11-05 NOTE — MEDICAL STUDENT PROGRESS NOTE(EDUCATION) - SUBJECTIVE AND OBJECTIVE BOX
REDDY TILLMAN is a 29y  s/p  PPD1 of a viable male infant.     SUBJECTIVE:  Patient has no complaints, no acute events overnight.  Pain is well controlled with PRN pain medication.   She is ambulating, voiding, tolerating PO.   - flatus / - BM.   Minimal lochia.        OBJECTIVE:  Physical exam:  General: AOx3, NAD.  Abdomen: +BS, Soft, appropriately tender to palpitation, firm uterine fundus below umbilicus.  Ext: No DVT signs, warm extremities. Patient has small hemorrhages under left 2nd finger with 2cm darkened areas on L wrist and R shin. Patient endorses pain in these areas. Negative Nikolas's sign    Vital Signs Last 24 Hrs  T(C): 36.6 (2019 00:00), Max: 36.9 (2019 08:10)  T(F): 97.9 (2019 00:00), Max: 98.5 (2019 09:06)  HR: 81 (2019 00:00) (65 - 91)  BP: 99/62 (2019 00:00) (84/44 - 132/83)  RR: 18 (2019 00:00) (18 - 18)  SpO2: 99% (2019 00:00) (99% - 100%)    LABS:                        11.2   12.09 )-----------( 318      ( 2019 04:11 )             34.0 29y  s/p  PPD1 of a viable male infant.     SUBJECTIVE:  Patient has no complaints, no acute events overnight.  Pain is well controlled with PRN pain medication.   She is ambulating, voiding, tolerating PO.   - flatus / - BM.   Minimal lochia.        OBJECTIVE:  Physical exam:  General: AOx3, NAD.  Abdomen: +BS, Soft, appropriately tender to palpitation, firm uterine fundus below umbilicus.  Ext: No DVT signs, warm extremities. Patient has small hemorrhages under left 2nd finger with 2cm darkened areas on L wrist and R shin. Patient endorses pain in these areas. Negative Nikolas's sign    Vital Signs Last 24 Hrs  T(C): 36.6 (2019 00:00), Max: 36.9 (2019 08:10)  T(F): 97.9 (2019 00:00), Max: 98.5 (2019 09:06)  HR: 81 (2019 00:00) (65 - 91)  BP: 99/62 (2019 00:00) (84/44 - 132/83)  RR: 18 (2019 00:00) (18 - 18)  SpO2: 99% (2019 00:00) (99% - 100%)    LABS:                        11.2   12.09 )-----------( 318      ( 2019 04:11 )             34.0

## 2019-11-06 VITALS
TEMPERATURE: 98 F | HEART RATE: 74 BPM | DIASTOLIC BLOOD PRESSURE: 59 MMHG | RESPIRATION RATE: 16 BRPM | SYSTOLIC BLOOD PRESSURE: 95 MMHG

## 2019-11-06 PROCEDURE — 86780 TREPONEMA PALLIDUM: CPT

## 2019-11-06 PROCEDURE — 85027 COMPLETE CBC AUTOMATED: CPT

## 2019-11-06 PROCEDURE — 85014 HEMATOCRIT: CPT

## 2019-11-06 PROCEDURE — 86901 BLOOD TYPING SEROLOGIC RH(D): CPT

## 2019-11-06 PROCEDURE — 59050 FETAL MONITOR W/REPORT: CPT

## 2019-11-06 PROCEDURE — 85018 HEMOGLOBIN: CPT

## 2019-11-06 PROCEDURE — 86850 RBC ANTIBODY SCREEN: CPT

## 2019-11-06 PROCEDURE — G0463: CPT

## 2019-11-06 PROCEDURE — 81001 URINALYSIS AUTO W/SCOPE: CPT

## 2019-11-06 PROCEDURE — 36415 COLL VENOUS BLD VENIPUNCTURE: CPT

## 2019-11-06 PROCEDURE — 59025 FETAL NON-STRESS TEST: CPT

## 2019-11-06 PROCEDURE — 86900 BLOOD TYPING SEROLOGIC ABO: CPT

## 2019-11-06 RX ORDER — IBUPROFEN 200 MG
1 TABLET ORAL
Qty: 20 | Refills: 0
Start: 2019-11-06 | End: 2019-11-10

## 2019-11-06 RX ORDER — IBUPROFEN 200 MG
1 TABLET ORAL
Qty: 15 | Refills: 0
Start: 2019-11-06 | End: 2024-03-13

## 2019-11-06 NOTE — PROGRESS NOTE ADULT - ASSESSMENT
29y year old  PPD#2 s/p  at 20dzb0l gestation. Expected clinical improvement.     Problem:    1. Normal spontaneous vaginal Delivery ()    -Patient is feeling well and hemodynamically stable  -Tolerating PO and voiding  -c/w pain management PRN  -Encourage breast feeding and ambulation  -c/w routine postpartum care  -Plan for d/c today, pending attending's approval

## 2019-11-06 NOTE — CHART NOTE - NSCHARTNOTEFT_GEN_A_CORE
29y  PPD#2  s/p , stable    denies abd pain   reports normal vaginal bleeding  breast feeding       Vital Signs Last 24 Hrs  T(C): 36.3 (2019 19:30), Max: 36.3 (2019 09:38)  T(F): 97.4 (2019 19:30), Max: 97.4 (2019 09:38)  HR: 68 (:30) (68 - 72)  BP: 103/66 (2019 19:30) (95/59 - 103/66)  BP(mean): 78 (:30) (78 - 78)  RR: 16 (:) (16 - 18)  SpO2: 100% (:) (100% - 100%)    PHYSICAL EXAM:    CHEST/LUNG: Clear to percussion bilaterally; No rales, rhonchi, wheezing, or rubs  HEART: Regular rate and rhythm; No murmurs, rubs, or gallops  BREASTS: Not engorged nipples everted  ABDOMEN: Soft, Nontender, Nondistended; Bowel sounds present  PERINEUM: Intact  and lochia minimal  EXTREMITIES:  2+ Peripheral Pulses, No clubbing, cyanosis, or edema    MEDICATIONS  (STANDING):  acetaminophen   Tablet .. 975 milliGRAM(s) Oral   diphtheria/tetanus/pertussis (acellular) Vaccine (ADAcel) 0.5 milliLiter(s) IntraMuscular once  ibuprofen  Tablet. 600 milliGRAM(s) Oral every 6 hours  prenatal multivitamin 1 Tablet(s) Oral daily      LABS:                        9.6    x     )-----------( x        ( 2019 06:28 )             29.5       1. Pain: well controlled on OPM  2. GI: Regular diet  3. : voiding  4. DVT prophylaxis: ambulate  5. Dispo: PPD 2,DC home today

## 2019-11-06 NOTE — PROGRESS NOTE ADULT - ATTENDING COMMENTS
I agree with above assessment and plan  patient to be discharged and follow up at the [facility] center

## 2019-11-06 NOTE — PROGRESS NOTE ADULT - PROBLEM SELECTOR PLAN 1
1. discharge home  2. follow up in 3-4 weeks [Sight Problems] : sight problems [Abn Vag Bleeding] : abnormal vaginal bleeding [Nl] : Hematologic/Lymphatic

## 2019-11-06 NOTE — PROGRESS NOTE ADULT - SUBJECTIVE AND OBJECTIVE BOX
29y year old  PPD#2 s/p  at 95cfg0a gestation.     No acute overnight events. Pain well controlled.   Patient is ambulating, +voiding, +flatus, -BM  Reports minimal lochia.   +breast feeding, -breast tenderness    VS:   Vital Signs Last 24 Hrs  T(C): 36.3 (2019 19:30), Max: 36.3 (2019 09:38)  T(F): 97.4 (2019 19:30), Max: 97.4 (2019 09:38)  HR: 68 (2019 19:30) (68 - 72)  BP: 103/66 (2019 19:30) (95/59 - 103/66)  BP(mean): 78 (:) (78 - 78)  RR: 16 (:) (16 - 18)  SpO2: 100% (:) (100% - 100%)    Physical Exam:  General: NAD, AAOx3  Heart: RRR, Normal S1/S2, No m/g/r  Lungs:CTAB  Abdomen: soft, ND, firm fundus palpated at the umbilicus.   Ext:  No cyanosis, no edema, pulses +2 x4, no Nikolas's sign, nontender lower extremity bilaterally.    Labs:                        9.6    x     )-----------( x        ( 2019 06:28 )             29.5     MEDICATIONS  (STANDING):  acetaminophen   Tablet .. 975 milliGRAM(s) Oral <User Schedule>  diphtheria/tetanus/pertussis (acellular) Vaccine (ADAcel) 0.5 milliLiter(s) IntraMuscular once  ibuprofen  Tablet. 600 milliGRAM(s) Oral every 6 hours  oxytocin Infusion 333.333 milliUNIT(s)/Min (1000 mL/Hr) IV Continuous <Continuous>  oxytocin Infusion 333.333 milliUNIT(s)/Min (1000 mL/Hr) IV Continuous <Continuous>  prenatal multivitamin 1 Tablet(s) Oral daily  sodium chloride 0.9% lock flush 3 milliLiter(s) IV Push every 8 hours    MEDICATIONS  (PRN):  benzocaine 20%/menthol 0.5% Spray 1 Spray(s) Topical every 6 hours PRN for Perineal discomfort  dibucaine 1% Ointment 1 Application(s) Topical every 6 hours PRN Perineal discomfort  diphenhydrAMINE 25 milliGRAM(s) Oral every 6 hours PRN Pruritus  glycerin Suppository - Adult 1 Suppository(s) Rectal at bedtime PRN Constipation  hydrocortisone 1% Cream 1 Application(s) Topical every 6 hours PRN Moderate Pain (4-6)  lanolin Ointment 1 Application(s) Topical every 6 hours PRN nipple soreness  magnesium hydroxide Suspension 30 milliLiter(s) Oral two times a day PRN Constipation  oxyCODONE    IR 5 milliGRAM(s) Oral every 3 hours PRN Moderate to Severe Pain (4-10)  oxyCODONE    IR 5 milliGRAM(s) Oral once PRN Moderate to Severe Pain (4-10)  pramoxine 1%/zinc 5% Cream 1 Application(s) Topical every 4 hours PRN Moderate Pain (4-6)  simethicone 80 milliGRAM(s) Chew every 4 hours PRN Gas  witch hazel Pads 1 Application(s) Topical every 4 hours PRN Perineal discomfort

## 2022-08-31 ENCOUNTER — EMERGENCY (EMERGENCY)
Facility: HOSPITAL | Age: 32
LOS: 1 days | Discharge: HOME | End: 2022-08-31
Attending: STUDENT IN AN ORGANIZED HEALTH CARE EDUCATION/TRAINING PROGRAM
Payer: MEDICAID

## 2022-08-31 VITALS
HEIGHT: 64 IN | SYSTOLIC BLOOD PRESSURE: 107 MMHG | RESPIRATION RATE: 16 BRPM | WEIGHT: 138.89 LBS | HEART RATE: 86 BPM | DIASTOLIC BLOOD PRESSURE: 66 MMHG | TEMPERATURE: 98 F | OXYGEN SATURATION: 99 %

## 2022-08-31 LAB
ALBUMIN SERPL ELPH-MCNC: 4.2 G/DL — SIGNIFICANT CHANGE UP (ref 3.3–5.2)
ALP SERPL-CCNC: 74 U/L — SIGNIFICANT CHANGE UP (ref 40–120)
ALT FLD-CCNC: 7 U/L — SIGNIFICANT CHANGE UP
ANION GAP SERPL CALC-SCNC: 11 MMOL/L — SIGNIFICANT CHANGE UP (ref 5–17)
APPEARANCE UR: CLEAR — SIGNIFICANT CHANGE UP
APTT BLD: 33.6 SEC — SIGNIFICANT CHANGE UP (ref 27.5–35.5)
AST SERPL-CCNC: 14 U/L — SIGNIFICANT CHANGE UP
BACTERIA # UR AUTO: ABNORMAL
BASOPHILS # BLD AUTO: 0.04 K/UL — SIGNIFICANT CHANGE UP (ref 0–0.2)
BASOPHILS NFR BLD AUTO: 0.5 % — SIGNIFICANT CHANGE UP (ref 0–2)
BILIRUB SERPL-MCNC: 0.5 MG/DL — SIGNIFICANT CHANGE UP (ref 0.4–2)
BILIRUB UR-MCNC: NEGATIVE — SIGNIFICANT CHANGE UP
BUN SERPL-MCNC: 5.2 MG/DL — LOW (ref 8–20)
CALCIUM SERPL-MCNC: 9 MG/DL — SIGNIFICANT CHANGE UP (ref 8.4–10.5)
CHLORIDE SERPL-SCNC: 103 MMOL/L — SIGNIFICANT CHANGE UP (ref 98–107)
CO2 SERPL-SCNC: 23 MMOL/L — SIGNIFICANT CHANGE UP (ref 22–29)
COLOR SPEC: YELLOW — SIGNIFICANT CHANGE UP
CREAT SERPL-MCNC: 0.57 MG/DL — SIGNIFICANT CHANGE UP (ref 0.5–1.3)
DIFF PNL FLD: ABNORMAL
EGFR: 122 ML/MIN/1.73M2 — SIGNIFICANT CHANGE UP
EOSINOPHIL # BLD AUTO: 0.22 K/UL — SIGNIFICANT CHANGE UP (ref 0–0.5)
EOSINOPHIL NFR BLD AUTO: 2.9 % — SIGNIFICANT CHANGE UP (ref 0–6)
EPI CELLS # UR: SIGNIFICANT CHANGE UP
GLUCOSE SERPL-MCNC: 101 MG/DL — HIGH (ref 70–99)
GLUCOSE UR QL: NEGATIVE MG/DL — SIGNIFICANT CHANGE UP
HCG SERPL-ACNC: <4 MIU/ML — SIGNIFICANT CHANGE UP
HCT VFR BLD CALC: 38.8 % — SIGNIFICANT CHANGE UP (ref 34.5–45)
HGB BLD-MCNC: 13.2 G/DL — SIGNIFICANT CHANGE UP (ref 11.5–15.5)
IMM GRANULOCYTES NFR BLD AUTO: 0.3 % — SIGNIFICANT CHANGE UP (ref 0–1.5)
INR BLD: 1.18 RATIO — HIGH (ref 0.88–1.16)
KETONES UR-MCNC: NEGATIVE — SIGNIFICANT CHANGE UP
LEUKOCYTE ESTERASE UR-ACNC: NEGATIVE — SIGNIFICANT CHANGE UP
LYMPHOCYTES # BLD AUTO: 2.36 K/UL — SIGNIFICANT CHANGE UP (ref 1–3.3)
LYMPHOCYTES # BLD AUTO: 31.6 % — SIGNIFICANT CHANGE UP (ref 13–44)
MAGNESIUM SERPL-MCNC: 2.1 MG/DL — SIGNIFICANT CHANGE UP (ref 1.6–2.6)
MCHC RBC-ENTMCNC: 30.9 PG — SIGNIFICANT CHANGE UP (ref 27–34)
MCHC RBC-ENTMCNC: 34 GM/DL — SIGNIFICANT CHANGE UP (ref 32–36)
MCV RBC AUTO: 90.9 FL — SIGNIFICANT CHANGE UP (ref 80–100)
MONOCYTES # BLD AUTO: 0.53 K/UL — SIGNIFICANT CHANGE UP (ref 0–0.9)
MONOCYTES NFR BLD AUTO: 7.1 % — SIGNIFICANT CHANGE UP (ref 2–14)
NEUTROPHILS # BLD AUTO: 4.29 K/UL — SIGNIFICANT CHANGE UP (ref 1.8–7.4)
NEUTROPHILS NFR BLD AUTO: 57.6 % — SIGNIFICANT CHANGE UP (ref 43–77)
NITRITE UR-MCNC: NEGATIVE — SIGNIFICANT CHANGE UP
PH UR: 6.5 — SIGNIFICANT CHANGE UP (ref 5–8)
PLATELET # BLD AUTO: 274 K/UL — SIGNIFICANT CHANGE UP (ref 150–400)
POTASSIUM SERPL-MCNC: 3.5 MMOL/L — SIGNIFICANT CHANGE UP (ref 3.5–5.3)
POTASSIUM SERPL-SCNC: 3.5 MMOL/L — SIGNIFICANT CHANGE UP (ref 3.5–5.3)
PROT SERPL-MCNC: 7.6 G/DL — SIGNIFICANT CHANGE UP (ref 6.6–8.7)
PROT UR-MCNC: NEGATIVE — SIGNIFICANT CHANGE UP
PROTHROM AB SERPL-ACNC: 13.7 SEC — HIGH (ref 10.5–13.4)
RBC # BLD: 4.27 M/UL — SIGNIFICANT CHANGE UP (ref 3.8–5.2)
RBC # FLD: 12.2 % — SIGNIFICANT CHANGE UP (ref 10.3–14.5)
RBC CASTS # UR COMP ASSIST: SIGNIFICANT CHANGE UP /HPF (ref 0–4)
SODIUM SERPL-SCNC: 137 MMOL/L — SIGNIFICANT CHANGE UP (ref 135–145)
SP GR SPEC: 1 — LOW (ref 1.01–1.02)
UROBILINOGEN FLD QL: NEGATIVE MG/DL — SIGNIFICANT CHANGE UP
WBC # BLD: 7.46 K/UL — SIGNIFICANT CHANGE UP (ref 3.8–10.5)
WBC # FLD AUTO: 7.46 K/UL — SIGNIFICANT CHANGE UP (ref 3.8–10.5)
WBC UR QL: SIGNIFICANT CHANGE UP /HPF (ref 0–5)

## 2022-08-31 PROCEDURE — 96374 THER/PROPH/DIAG INJ IV PUSH: CPT

## 2022-08-31 PROCEDURE — 99285 EMERGENCY DEPT VISIT HI MDM: CPT

## 2022-08-31 PROCEDURE — 84702 CHORIONIC GONADOTROPIN TEST: CPT

## 2022-08-31 PROCEDURE — 76856 US EXAM PELVIC COMPLETE: CPT | Mod: 26

## 2022-08-31 PROCEDURE — 76856 US EXAM PELVIC COMPLETE: CPT

## 2022-08-31 PROCEDURE — 36415 COLL VENOUS BLD VENIPUNCTURE: CPT

## 2022-08-31 PROCEDURE — 85730 THROMBOPLASTIN TIME PARTIAL: CPT

## 2022-08-31 PROCEDURE — 74176 CT ABD & PELVIS W/O CONTRAST: CPT | Mod: MA

## 2022-08-31 PROCEDURE — 85610 PROTHROMBIN TIME: CPT

## 2022-08-31 PROCEDURE — 76830 TRANSVAGINAL US NON-OB: CPT

## 2022-08-31 PROCEDURE — 74176 CT ABD & PELVIS W/O CONTRAST: CPT | Mod: 26,MA

## 2022-08-31 PROCEDURE — 81001 URINALYSIS AUTO W/SCOPE: CPT

## 2022-08-31 PROCEDURE — 83735 ASSAY OF MAGNESIUM: CPT

## 2022-08-31 PROCEDURE — 85025 COMPLETE CBC W/AUTO DIFF WBC: CPT

## 2022-08-31 PROCEDURE — 80053 COMPREHEN METABOLIC PANEL: CPT

## 2022-08-31 PROCEDURE — 76830 TRANSVAGINAL US NON-OB: CPT | Mod: 26

## 2022-08-31 PROCEDURE — 99284 EMERGENCY DEPT VISIT MOD MDM: CPT | Mod: 25

## 2022-08-31 PROCEDURE — 87086 URINE CULTURE/COLONY COUNT: CPT

## 2022-08-31 RX ORDER — KETOROLAC TROMETHAMINE 30 MG/ML
15 SYRINGE (ML) INJECTION ONCE
Refills: 0 | Status: DISCONTINUED | OUTPATIENT
Start: 2022-08-31 | End: 2022-08-31

## 2022-08-31 RX ADMIN — Medication 15 MILLIGRAM(S): at 20:39

## 2022-08-31 NOTE — ED STATDOCS - NSFOLLOWUPINSTRUCTIONS_ED_ALL_ED_FT
- Ramirez un seguimiento con grey médico de atención primaria en 1 o 2 días. Si no puede hacer un seguimiento con grey médico de atención primaria, regrese al servicio de urgencias por cualquier problema urgente.  - Busque atención médica inmediata ante cualquier signo o síntoma nuevo, que empeore o que le preocupe.  - Se le entregaron copias de todos los resultados de nilda pruebas, llévelas a grey médico de atención primaria para que revise los resultados anormales  - Si tiene dificultades para realizar un seguimiento, jeniffer al: [phone] ([phone] o visite [email or web address] para obtener un médico o especialista de Health system que acepte grey seguro en grey área.    ¡Sentirse mejor!     Si usted no tiene un médico de primaria por favor llame para hacer telma saji en cualquiera de las clínicas de atención primaria que se enumeran a continuación:    [facility]  [address] [place] [address]  Phone: [phone]    [place] [address]  Phone: [phone]       Dolor abdominal en los adultos    Abdominal Pain, Adult      El dolor de estómago (abdominal) puede tener muchas causas. La mayoría de las veces, el dolor de estómago no es peligroso. Muchos de estos casos de dolor de estómago pueden controlarse y tratarse en casa. Sin embargo, a veces, el dolor de estómago es grave. El médico intentará descubrir la causa del dolor de estómago.      Siga estas instrucciones en grey casa:     Medicamentos     •[place] los medicamentos de venta sadie y los recetados solamente fiorella se lo haya indicado el médico.      • No tome medicamentos que lo ayuden a defecar (laxantes), brenda que el médico se lo indique.      Instrucciones generales     •Esté atento al dolor de estómago para detectar cualquier cambio.      •Ginny suficiente líquido para mantener el pis (la orina) de color amarillo pálido.      •Concurra a todas las visitas de seguimiento fiorella se lo haya indicado el médico. [place] es importante.        Comuníquese con un médico si:    •El dolor de estómago cambia o empeora.      •No tiene apetito o baja de peso sin proponérselo.      •Tiene dificultades para defecar (está estreñido) o heces líquidas (diarrea) nishant más de 2 o 3 días.      •Siente dolor al orinar o defecar.      •El dolor de estómago lo despierta de noche.      •El dolor empeora con las comidas, después de comer o con determinados alimentos.      •Tiene vómitos y no puede retener nada de lo que ingiere.      •Tiene fiebre.      •Observa michael en la orina.        Solicite ayuda de inmediato si:    •El dolor no desaparece en el tiempo indicado por el médico.      •No puede dejar de vomitar.      •Siente dolor solamente en zonas específicas del abdomen, fiorella el lado derecho o la parte inferior izquierda.      •Tiene heces con michael, de color darvin o con aspecto alquitranado.      •Tiene dolor muy intenso en el vientre, cólicos o meteorismo.    •Presenta signos de no tener suficientes líquidos o agua en el cuerpo (deshidratación), por ejemplo:  •Orina oscura, muy escasa o falta de orina.      •Labios agrietados.      •Sequedad de boca.      •Ojos hundidos.      •Somnolencia.      •Debilidad.        •Tiene dificultad para respirar o dolor en el pecho.

## 2022-08-31 NOTE — ED ADULT NURSE NOTE - OBJECTIVE STATEMENT
pt is a 32y female pt, aox4 primarily Burmese speaking.  c/o LLQ pain that radiates around to her back since 1:30 this afternoon.  denies hematuria, dysuria, nausea, vomiting, diarrhea, trauma.

## 2022-08-31 NOTE — ED ADULT NURSE NOTE - NSIMPLEMENTINTERV_GEN_ALL_ED
Implemented All Universal Safety Interventions:  [place] to call system. Call bell, personal items and telephone within reach. Instruct patient to call for assistance. Room bathroom lighting operational. Non-slip footwear when patient is off stretcher. Physically safe environment: no spills, clutter or unnecessary equipment. Stretcher in lowest position, wheels locked, appropriate side rails in place.

## 2022-08-31 NOTE — ED STATDOCS - PROGRESS NOTE DETAILS
RADHA Pino NOTE: Pt evaluated at bedside. Pt reports left sided abdominal discomfort starting today.   CT is pending   Pt evaluated prior by intake physician. Otherwise HPI/PE/ROS as noted above. Will follow up plan per intake physician. ED  Kevin RADHA Pino NOTE: Pt evaluated at bedside. Pt reports left sided abdominal discomfort starting today.   CT is pending   Pt evaluated prior by intake physician. Otherwise HPI/PE/ROS as noted above. Will follow up plan per intake physician. ED  Kevin Ny RADHA Pino NOTE: Reviewed all results and plan, no acute findings on imaging, abd soft NTND no rebound or guarding. Pt stable for d/c, reports improvement, VSS, tolerating PO, ambulatory.  Discussion includes results, plan, and return precautions. Pt advised to f/u with PMD 1-2 day.  Printed copies of available lab/radiology results contained within discharge packet. Pt verbalized understanding/agreement of plan. ED  Keerthi Subramanian

## 2022-08-31 NOTE — ED STATDOCS - NS ED ATTENDING STATEMENT MOD
This was a shared visit with the IMANI. I reviewed and verified the documentation and independently performed the documented:

## 2022-08-31 NOTE — ED STATDOCS - OBJECTIVE STATEMENT
33 y/o female with PMHx of Ovarian cysts presents to ED c/o abdominal pain. Patient reports left sided abdominal pain that began at 130pm today.    Pt denies fevers/chills, ha, loc, focal neuro deficits, cp/sob/palp, cough, n/v/d, urinary symptoms, recent travel and sick contacts, hx of STD's, drug use, smoking, alcohol use  LNMP: 8/25/22

## 2022-08-31 NOTE — ED STATDOCS - PATIENT PORTAL LINK FT
You can access the FollowMyHealth Patient Portal offered by [facility] by registering at the following website: [email or web address]. By joining [employer]’s FollowMyHealth portal, you will also be able to view your health information using other applications (apps) compatible with our system.

## 2022-08-31 NOTE — ED ADULT TRIAGE NOTE - CHIEF COMPLAINT QUOTE
Pt with LLQ pain that radiates around to her back since 1:30 this afternoon. Denies any urinary symptoms. Denies and N/V/D.

## 2022-08-31 NOTE — ED STATDOCS - CLINICAL SUMMARY MEDICAL DECISION MAKING FREE TEXT BOX
33 y/o female with PMHx of Ovarian cysts presents to ED c/o abdominal pain. Evaluate for ovarian torsion considering hx of ovarian cyst. Also will do labs, UA, if there is hematuria, may consider renal imaging.

## 2022-08-31 NOTE — ED STATDOCS - PHYSICAL EXAMINATION
Const: Awake, alert and oriented. In no acute distress. Well appearing.  HEENT: NC/AT. Moist mucous membranes.  Eyes: No scleral icterus. EOMI.  Neck:. Soft and supple. Full ROM without pain.  Cardiac: Regular rate and regular rhythm. +S1/S2. Peripheral pulses 2+ and symmetric. No LE edema.  Resp: Speaking in full sentences. No evidence of respiratory distress. No wheezes, rales or rhonchi.  Abd: Soft, non-tender, non-distended. Normal bowel sounds in all 4 quadrants. No guarding or rebound.  Back: Spine midline and non-tender. (+) left CVAT.  Skin: No rashes, abrasions or lacerations.  Lymph: No cervical lymphadenopathy.  Neuro: Awake, alert & oriented x 3. Moves all extremities symmetrically.

## 2022-08-31 NOTE — ED ADULT NURSE REASSESSMENT NOTE - NS ED NURSE REASSESS COMMENT FT1
received report from day RN, assumed care of pt at change of shift.  pt is aox4, primarily Pashto speaking.  pt reports mild relief in abdominal pain.  denies nausea, vomiting, sob, cp, palpitations, diarrhea.  toradol given for pain.  awaiting CT scan

## 2022-08-31 NOTE — ED STATDOCS - NSICDXPASTMEDICALHX_GEN_ALL_CORE_FT
PAST MEDICAL HISTORY:  No pertinent past medical history        PAST MEDICAL HISTORY:  Ovarian cyst

## 2022-09-01 VITALS
OXYGEN SATURATION: 100 % | TEMPERATURE: 98 F | RESPIRATION RATE: 18 BRPM | DIASTOLIC BLOOD PRESSURE: 63 MMHG | HEART RATE: 72 BPM | SYSTOLIC BLOOD PRESSURE: 102 MMHG

## 2022-09-01 LAB
CULTURE RESULTS: SIGNIFICANT CHANGE UP
SPECIMEN SOURCE: SIGNIFICANT CHANGE UP

## 2022-09-01 NOTE — ED ADULT NURSE REASSESSMENT NOTE - NS ED NURSE REASSESS COMMENT FT1
pt sitting comfortably in rw12, awaiting abdominal ct results. reports abdominal discomfort, denies nausea, vomiting, diarrhea, sob, cp

## 2022-11-14 ENCOUNTER — EMERGENCY (EMERGENCY)
Facility: HOSPITAL | Age: 32
LOS: 1 days | Discharge: HOME | End: 2022-11-14
Attending: STUDENT IN AN ORGANIZED HEALTH CARE EDUCATION/TRAINING PROGRAM
Payer: MEDICAID

## 2022-11-14 VITALS
RESPIRATION RATE: 20 BRPM | SYSTOLIC BLOOD PRESSURE: 98 MMHG | DIASTOLIC BLOOD PRESSURE: 58 MMHG | HEIGHT: 55 IN | WEIGHT: 138.01 LBS | OXYGEN SATURATION: 98 % | TEMPERATURE: 98 F | HEART RATE: 76 BPM

## 2022-11-14 VITALS — DIASTOLIC BLOOD PRESSURE: 60 MMHG | RESPIRATION RATE: 20 BRPM | SYSTOLIC BLOOD PRESSURE: 100 MMHG | HEART RATE: 72 BPM

## 2022-11-14 PROBLEM — N83.209 UNSPECIFIED OVARIAN CYST, UNSPECIFIED SIDE: Chronic | Status: ACTIVE | Noted: 2022-08-31

## 2022-11-14 LAB
ALBUMIN SERPL ELPH-MCNC: 4.6 G/DL — SIGNIFICANT CHANGE UP (ref 3.3–5.2)
ALP SERPL-CCNC: 73 U/L — SIGNIFICANT CHANGE UP (ref 40–120)
ALT FLD-CCNC: 9 U/L — SIGNIFICANT CHANGE UP
ANION GAP SERPL CALC-SCNC: 14 MMOL/L — SIGNIFICANT CHANGE UP (ref 5–17)
APPEARANCE UR: CLEAR — SIGNIFICANT CHANGE UP
AST SERPL-CCNC: 16 U/L — SIGNIFICANT CHANGE UP
BACTERIA # UR AUTO: ABNORMAL
BASOPHILS # BLD AUTO: 0.03 K/UL — SIGNIFICANT CHANGE UP (ref 0–0.2)
BASOPHILS NFR BLD AUTO: 0.4 % — SIGNIFICANT CHANGE UP (ref 0–2)
BILIRUB SERPL-MCNC: 0.3 MG/DL — LOW (ref 0.4–2)
BILIRUB UR-MCNC: NEGATIVE — SIGNIFICANT CHANGE UP
BUN SERPL-MCNC: 8.8 MG/DL — SIGNIFICANT CHANGE UP (ref 8–20)
CALCIUM SERPL-MCNC: 9.2 MG/DL — SIGNIFICANT CHANGE UP (ref 8.4–10.5)
CHLORIDE SERPL-SCNC: 104 MMOL/L — SIGNIFICANT CHANGE UP (ref 96–108)
CO2 SERPL-SCNC: 23 MMOL/L — SIGNIFICANT CHANGE UP (ref 22–29)
COLOR SPEC: YELLOW — SIGNIFICANT CHANGE UP
CREAT SERPL-MCNC: 0.53 MG/DL — SIGNIFICANT CHANGE UP (ref 0.5–1.3)
DIFF PNL FLD: ABNORMAL
EGFR: 124 ML/MIN/1.73M2 — SIGNIFICANT CHANGE UP
EOSINOPHIL # BLD AUTO: 0.27 K/UL — SIGNIFICANT CHANGE UP (ref 0–0.5)
EOSINOPHIL NFR BLD AUTO: 3.9 % — SIGNIFICANT CHANGE UP (ref 0–6)
EPI CELLS # UR: SIGNIFICANT CHANGE UP
GLUCOSE SERPL-MCNC: 86 MG/DL — SIGNIFICANT CHANGE UP (ref 70–99)
GLUCOSE UR QL: NEGATIVE MG/DL — SIGNIFICANT CHANGE UP
HCG SERPL-ACNC: <4 MIU/ML — SIGNIFICANT CHANGE UP
HCT VFR BLD CALC: 38.3 % — SIGNIFICANT CHANGE UP (ref 34.5–45)
HGB BLD-MCNC: 13.1 G/DL — SIGNIFICANT CHANGE UP (ref 11.5–15.5)
IMM GRANULOCYTES NFR BLD AUTO: 0.1 % — SIGNIFICANT CHANGE UP (ref 0–0.9)
KETONES UR-MCNC: NEGATIVE — SIGNIFICANT CHANGE UP
LEUKOCYTE ESTERASE UR-ACNC: ABNORMAL
LIDOCAIN IGE QN: 19 U/L — LOW (ref 22–51)
LYMPHOCYTES # BLD AUTO: 2.46 K/UL — SIGNIFICANT CHANGE UP (ref 1–3.3)
LYMPHOCYTES # BLD AUTO: 35.9 % — SIGNIFICANT CHANGE UP (ref 13–44)
MCHC RBC-ENTMCNC: 31.4 PG — SIGNIFICANT CHANGE UP (ref 27–34)
MCHC RBC-ENTMCNC: 34.2 GM/DL — SIGNIFICANT CHANGE UP (ref 32–36)
MCV RBC AUTO: 91.8 FL — SIGNIFICANT CHANGE UP (ref 80–100)
MONOCYTES # BLD AUTO: 0.41 K/UL — SIGNIFICANT CHANGE UP (ref 0–0.9)
MONOCYTES NFR BLD AUTO: 6 % — SIGNIFICANT CHANGE UP (ref 2–14)
NEUTROPHILS # BLD AUTO: 3.67 K/UL — SIGNIFICANT CHANGE UP (ref 1.8–7.4)
NEUTROPHILS NFR BLD AUTO: 53.7 % — SIGNIFICANT CHANGE UP (ref 43–77)
NITRITE UR-MCNC: NEGATIVE — SIGNIFICANT CHANGE UP
PH UR: 7 — SIGNIFICANT CHANGE UP (ref 5–8)
PLATELET # BLD AUTO: 312 K/UL — SIGNIFICANT CHANGE UP (ref 150–400)
POTASSIUM SERPL-MCNC: 3.7 MMOL/L — SIGNIFICANT CHANGE UP (ref 3.5–5.3)
POTASSIUM SERPL-SCNC: 3.7 MMOL/L — SIGNIFICANT CHANGE UP (ref 3.5–5.3)
PROT SERPL-MCNC: 7.9 G/DL — SIGNIFICANT CHANGE UP (ref 6.6–8.7)
PROT UR-MCNC: NEGATIVE — SIGNIFICANT CHANGE UP
RAPID RVP RESULT: SIGNIFICANT CHANGE UP
RBC # BLD: 4.17 M/UL — SIGNIFICANT CHANGE UP (ref 3.8–5.2)
RBC # FLD: 12.5 % — SIGNIFICANT CHANGE UP (ref 10.3–14.5)
RBC CASTS # UR COMP ASSIST: SIGNIFICANT CHANGE UP /HPF (ref 0–4)
SARS-COV-2 RNA SPEC QL NAA+PROBE: SIGNIFICANT CHANGE UP
SODIUM SERPL-SCNC: 141 MMOL/L — SIGNIFICANT CHANGE UP (ref 135–145)
SP GR SPEC: 1 — LOW (ref 1.01–1.02)
TROPONIN T SERPL-MCNC: <0.01 NG/ML — SIGNIFICANT CHANGE UP (ref 0–0.06)
UROBILINOGEN FLD QL: NEGATIVE MG/DL — SIGNIFICANT CHANGE UP
WBC # BLD: 6.85 K/UL — SIGNIFICANT CHANGE UP (ref 3.8–10.5)
WBC # FLD AUTO: 6.85 K/UL — SIGNIFICANT CHANGE UP (ref 3.8–10.5)
WBC UR QL: SIGNIFICANT CHANGE UP /HPF (ref 0–5)

## 2022-11-14 PROCEDURE — 93010 ELECTROCARDIOGRAM REPORT: CPT

## 2022-11-14 PROCEDURE — 99285 EMERGENCY DEPT VISIT HI MDM: CPT

## 2022-11-14 PROCEDURE — 71046 X-RAY EXAM CHEST 2 VIEWS: CPT | Mod: 26

## 2022-11-14 NOTE — ED PROVIDER NOTE - OBJECTIVE STATEMENT
33yo F denies pmh presents for left-sided chest pain x1 month, left-sided abdominal pain x1.5 months. Reports both pains are intermittent, chest pain is pleuritic. Has been seen about the abdominal pain before, reports it is improving. Denies f/ch, cough, SOB, n/v, dysuria, hematuria. Denies sick contacts. Reports she went to clinic about abdominal pain, they sent her to ER due to the chest pain. Denies cardiac hx. No h/o abdominal surgeries.

## 2022-11-14 NOTE — ED PROVIDER NOTE - CLINICAL SUMMARY MEDICAL DECISION MAKING FREE TEXT BOX
33yo F denies pmh presents for left-sided chest pain x1 month, left-sided abdominal pain x1.5 months. EKG shows NSR. labs, CXR pending.

## 2022-11-14 NOTE — ED PROVIDER NOTE - PHYSICAL EXAMINATION
General: well appearing, NAD  Head: NC/AT  Cardiac: RRR, no m/r/g, +chest wall ttp on left sternal border  Respiratory: CTABL, equal chest wall expansions, no conversational dyspnea  Abdomen: soft, ND, +mild left-sided abdominal ttp  Neuro: AAOx3, coordinated movement of all 4 extremities  Psych: calm, cooperative, normal affect  Skin: warm and dry

## 2022-11-14 NOTE — ED PROVIDER NOTE - PATIENT PORTAL LINK FT
You can access the FollowMyHealth Patient Portal offered by [facility] by registering at the following website: [email or web address]. By joining [employer]’s FollowMyHealth portal, you will also be able to view your health information using other applications (apps) compatible with our system.

## 2022-11-14 NOTE — ED ADULT NURSE NOTE - OBJECTIVE STATEMENT
assumed care of pt at 16:50. pt reports non radiating chest pain for last month, new onset of sob and left lower non radiating abd pain for last 5 days. nausea present, denies vomiting, diarrhea or fevers. placed on cardiac monitor and . rr even and  unlabored . pt educated on plan of care, pt able to successfully teach back plan of care to RN, RN will continue to reeducate pt during hospital stay.

## 2022-11-14 NOTE — ED PROVIDER NOTE - NS ED ROS FT
Constitutional: no fever, no sweats, and no chills.  CV: +chest pain, no edema.  Resp: no cough, no dyspnea  GI: +abdominal pain, no nausea and no vomiting.  MSK: no msk pain, no weakness  Skin: no lesions, and no rashes.  Neuro: no LOC, no headache, no dizziness  ROS otherwise negative except as noted in HPI.

## 2022-11-14 NOTE — ED PROVIDER NOTE - CARE PROVIDER_API CALL
Sonia Abbott)  Cardiology; Internal Medicine  [address] [place] [address], [address] [id number]  Phone: [phone]  Fax: [phone]  Follow Up Time:

## 2022-11-14 NOTE — ED ADULT NURSE NOTE - CHIEF COMPLAINT QUOTE
BIBA from [facility] c/o l chest wl chest pain/ LLQ abd pain  for five days

## 2022-11-14 NOTE — ED PROVIDER NOTE - NSFOLLOWUPINSTRUCTIONS_ED_ALL_ED_FT
1. Alternate tylenol and motrin every 3 hours (take each every 6 hours) for pain.   2. Follow up with cardiology at the next available appointment.   3. Return to the ED for any new or worsening symptoms.     You were seen today in the emergency room for chest pain. Although the testing done today indicates that your pain is not from an acute emergency, your pain could still represent a problem with your heart. You need to follow up with your doctor and/or a cardiologist in the next 48-72 hours. If you develop any new or worsening symptoms you need to return immediately to the emergency department. If you experience any of the following please come right back to the emergency room: chest pain that becomes much worse with walking up stairs or exercising, uncontrollable nausea and vomiting, severe chest pain that will not go away, passing out, new persistent numbness and/or weakness. If we discussed that this pain was likely due to a muscle strain or sprain you should take over the counter medications such as Tylenol. You should avoid taking medications such as ibuprofen, Motrin, Advil or other NSAIDs until you speak with your doctor about your pain.

## 2022-11-15 PROCEDURE — 84702 CHORIONIC GONADOTROPIN TEST: CPT

## 2022-11-15 PROCEDURE — 0225U NFCT DS DNA&RNA 21 SARSCOV2: CPT

## 2022-11-15 PROCEDURE — 87186 SC STD MICRODIL/AGAR DIL: CPT

## 2022-11-15 PROCEDURE — 93005 ELECTROCARDIOGRAM TRACING: CPT

## 2022-11-15 PROCEDURE — 80053 COMPREHEN METABOLIC PANEL: CPT

## 2022-11-15 PROCEDURE — 81001 URINALYSIS AUTO W/SCOPE: CPT

## 2022-11-15 PROCEDURE — 85025 COMPLETE CBC W/AUTO DIFF WBC: CPT

## 2022-11-15 PROCEDURE — 83690 ASSAY OF LIPASE: CPT

## 2022-11-15 PROCEDURE — 87086 URINE CULTURE/COLONY COUNT: CPT

## 2022-11-15 PROCEDURE — 36415 COLL VENOUS BLD VENIPUNCTURE: CPT

## 2022-11-15 PROCEDURE — 84484 ASSAY OF TROPONIN QUANT: CPT

## 2022-11-15 PROCEDURE — 71046 X-RAY EXAM CHEST 2 VIEWS: CPT

## 2022-11-15 PROCEDURE — 99285 EMERGENCY DEPT VISIT HI MDM: CPT

## 2022-11-16 LAB
-  AMIKACIN: SIGNIFICANT CHANGE UP
-  AMOXICILLIN/CLAVULANIC ACID: SIGNIFICANT CHANGE UP
-  AMPICILLIN/SULBACTAM: SIGNIFICANT CHANGE UP
-  AMPICILLIN: SIGNIFICANT CHANGE UP
-  AZTREONAM: SIGNIFICANT CHANGE UP
-  CEFAZOLIN: SIGNIFICANT CHANGE UP
-  CEFEPIME: SIGNIFICANT CHANGE UP
-  CEFOXITIN: SIGNIFICANT CHANGE UP
-  CEFTRIAXONE: SIGNIFICANT CHANGE UP
-  CIPROFLOXACIN: SIGNIFICANT CHANGE UP
-  ERTAPENEM: SIGNIFICANT CHANGE UP
-  GENTAMICIN: SIGNIFICANT CHANGE UP
-  IMIPENEM: SIGNIFICANT CHANGE UP
-  LEVOFLOXACIN: SIGNIFICANT CHANGE UP
-  MEROPENEM: SIGNIFICANT CHANGE UP
-  NITROFURANTOIN: SIGNIFICANT CHANGE UP
-  PIPERACILLIN/TAZOBACTAM: SIGNIFICANT CHANGE UP
-  TOBRAMYCIN: SIGNIFICANT CHANGE UP
-  TRIMETHOPRIM/SULFAMETHOXAZOLE: SIGNIFICANT CHANGE UP
CULTURE RESULTS: SIGNIFICANT CHANGE UP
METHOD TYPE: SIGNIFICANT CHANGE UP
ORGANISM # SPEC MICROSCOPIC CNT: SIGNIFICANT CHANGE UP
ORGANISM # SPEC MICROSCOPIC CNT: SIGNIFICANT CHANGE UP
SPECIMEN SOURCE: SIGNIFICANT CHANGE UP

## 2022-11-19 RX ORDER — CEPHALEXIN 500 MG
1 CAPSULE ORAL
Qty: 14 | Refills: 0
Start: 2022-11-19 | End: 2022-11-25

## 2023-10-03 DIAGNOSIS — M47.816 SPONDYLOSIS W/OUT MYELOPATHY OR RADICULOPATHY, LUMBAR REGION: ICD-10-CM

## 2023-10-05 ENCOUNTER — APPOINTMENT (OUTPATIENT)
Dept: ORTHOPEDIC SURGERY | Facility: CLINIC | Age: 33
End: 2023-10-05
Payer: MEDICAID

## 2023-10-05 VITALS
DIASTOLIC BLOOD PRESSURE: 67 MMHG | WEIGHT: 145 LBS | SYSTOLIC BLOOD PRESSURE: 105 MMHG | BODY MASS INDEX: 27.38 KG/M2 | HEART RATE: 92 BPM | HEIGHT: 61 IN

## 2023-10-05 DIAGNOSIS — M54.16 RADICULOPATHY, LUMBAR REGION: ICD-10-CM

## 2023-10-05 PROCEDURE — 99203 OFFICE O/P NEW LOW 30 MIN: CPT

## 2023-10-05 RX ORDER — MELOXICAM 15 MG/1
15 TABLET ORAL
Qty: 30 | Refills: 0 | Status: ACTIVE | COMMUNITY
Start: 2023-10-05 | End: 1900-01-01

## 2023-11-05 NOTE — ED ADULT TRIAGE NOTE - PRO INTERPRETER NEED 2
Infusion Nursing Note:  Thierno Vega presents today for 2L IVF & labs.    Patient seen by provider today: No   present during visit today: Patient refused  services and a waiver form has been signed.     Note: Thierno presents to infusion today feeling well. He denies any pain, infectious symptoms, or other concerns, stating his upset stomach has improved recently.    Intravenous Access:  Peripheral IV placed.    Treatment Conditions:  Lab Results   Component Value Date    HGB 8.8 (L) 11/05/2023    WBC 5.8 11/05/2023    ANEUTAUTO 4.5 11/05/2023     11/05/2023      Lab Results   Component Value Date     11/05/2023    POTASSIUM 4.3 11/05/2023    MAG 2.0 10/18/2023    CR 1.48 (H) 11/05/2023    SOTO 9.6 11/05/2023    BILITOTAL 0.2 11/05/2023    ALBUMIN 4.2 11/05/2023    ALT 18 11/05/2023    AST 14 11/05/2023     Post Infusion Assessment:  Patient tolerated infusion without incident.  Blood return noted pre and post infusion.  Site patent and intact, free from redness, edema or discomfort.  No evidence of extravasations.  Access discontinued per protocol.     Discharge Plan:   Discharge instructions reviewed with: Patient.  Patient and/or family verbalized understanding of discharge instructions and all questions answered.  AVS to patient via [employer].  Patient will return 11/8 for next appointment.   Patient discharged in stable condition accompanied by: self.  Departure Mode: Ambulatory.      Marcela Parmar RN   [place]

## 2024-01-29 ENCOUNTER — APPOINTMENT (OUTPATIENT)
Dept: NEUROLOGY | Facility: CLINIC | Age: 34
End: 2024-01-29
Payer: MEDICAID

## 2024-01-29 VITALS
DIASTOLIC BLOOD PRESSURE: 70 MMHG | WEIGHT: 142 LBS | BODY MASS INDEX: 26.81 KG/M2 | SYSTOLIC BLOOD PRESSURE: 100 MMHG | HEIGHT: 61 IN

## 2024-01-29 DIAGNOSIS — G44.89 OTHER HEADACHE SYNDROME: ICD-10-CM

## 2024-01-29 PROCEDURE — G2211 COMPLEX E/M VISIT ADD ON: CPT

## 2024-01-29 PROCEDURE — 99204 OFFICE O/P NEW MOD 45 MIN: CPT

## 2024-01-29 RX ORDER — NORTRIPTYLINE HYDROCHLORIDE 25 MG/1
25 CAPSULE ORAL
Qty: 30 | Refills: 4 | Status: ACTIVE | COMMUNITY
Start: 2024-01-29 | End: 1900-01-01

## 2024-01-29 NOTE — CONSULT LETTER
[Dear  ___] : Dear  [unfilled], [Consult Letter:] : I had the pleasure of evaluating your patient, [unfilled]. [Please see my note below.] : Please see my note below. [Consult Closing:] : Thank you very much for allowing me to participate in the care of this patient.  If you have any questions, please do not hesitate to contact me. [Sincerely,] : Sincerely, [FreeTextEntry3] : Omari Posey MD, PhD, DPN-N [facility] [place] Chief, Division of Neurology [facility]

## 2024-01-29 NOTE — ASSESSMENT
[FreeTextEntry1] : Headaches Her neurological examination is normal The headaches sound for the most part migrainous in nature There is likely a significant component of analgesic overuse-rebound headache associated with daily use of Tylenol and I have explained this to her in great detail We are going to initiate prophylaxis with nortriptyline 25 mg at bedtime.  I have asked her to rapidly taper off the Tylenol and take it no more than once or twice a week Office follow-up in 4 months and by phone prior to that in a month

## 2024-01-29 NOTE — HISTORY OF PRESENT ILLNESS
[FreeTextEntry1] : This 33-year-old woman was seen in neurological consultation today.   services used She reports headaches going on for the last 5 to 6 months.  They are present pretty much daily.  They are predominantly frontal and she describes a throbbing sensation.  There is occasional nausea and vomiting, typically photophobia.  There is no visual aura or visual disturbance Headache will last hours.  She is using Tylenol daily for the most part taking it twice a day.  Tylenol gives her temporary relief She denies any specific triggers for the headaches Denies any head injuries Denies any focal weakness or numbness  She had a brain MRI 9/21/2023 which reports a few T2/FLAIR hyperintensities without any acute or focal abnormalities  Medical history otherwise unremarkable  Non-smoker, no alcohol use, works doing packaging.

## 2024-01-29 NOTE — PHYSICAL EXAM
[FreeTextEntry1] : .Cervical [General Appearance - Alert] : alert [General Appearance - In No Acute Distress] : in no acute distress [General Appearance - Well-Appearing] : healthy appearing [Oriented To Time, Place, And Person] : oriented to person, place, and time [Impaired Insight] : insight and judgment were intact [Affect] : the affect was normal [Memory Recent] : recent memory was not impaired [Person] : oriented to person [Place] : oriented to place [Time] : oriented to time [Concentration Intact] : normal concentrating ability [Fluency] : fluency intact [Comprehension] : comprehension intact [Past History] : adequate knowledge of personal past history [Cranial Nerves Optic (II)] : visual acuity intact bilaterally,  visual fields full to confrontation, pupils equal round and reactive to light [Cranial Nerves Oculomotor (III)] : extraocular motion intact [Cranial Nerves Trigeminal (V)] : facial sensation intact symmetrically [Cranial Nerves Facial (VII)] : face symmetrical [Cranial Nerves Vestibulocochlear (VIII)] : hearing was intact bilaterally [Cranial Nerves Glossopharyngeal (IX)] : tongue and palate midline [Cranial Nerves Accessory (XI - Cranial And Spinal)] : head turning and shoulder shrug symmetric [Cranial Nerves Hypoglossal (XII)] : there was no tongue deviation with protrusion [Motor Tone] : muscle tone was normal in all four extremities [Motor Strength] : muscle strength was normal in all four extremities [No Muscle Atrophy] : normal bulk in all four extremities [Paresis Pronator Drift Right-Sided] : no pronator drift on the right [Paresis Pronator Drift Left-Sided] : no pronator drift on the left [Sensation Tactile Decrease] : light touch was intact [Sensation Pain / Temperature Decrease] : pain and temperature was intact [Romberg's Sign] : Romberg's sign was negtive [Abnormal Walk] : normal gait [Balance] : balance was intact [Past-pointing] : there was no past-pointing [Tremor] : no tremor present [Coordination - Dysmetria Impaired Finger-to-Nose Bilateral] : not present [Coordination - Dysmetria Impaired Heel-to-Shin Bilateral] : not present [2+] : Ankle jerk right 2+ [PERRL With Normal Accommodation] : pupils were equal in size, round, reactive to light, with normal accommodation [Extraocular Movements] : extraocular movements were intact [Optic Disc Abnormality] : the optic disc were normal in size and color [Full Visual Field] : full visual field

## 2024-03-09 ENCOUNTER — EMERGENCY (EMERGENCY)
Facility: HOSPITAL | Age: 34
LOS: 1 days | Discharge: HOME | End: 2024-03-09
Attending: EMERGENCY MEDICINE
Payer: COMMERCIAL

## 2024-03-09 VITALS
HEART RATE: 86 BPM | WEIGHT: 130.07 LBS | TEMPERATURE: 97 F | SYSTOLIC BLOOD PRESSURE: 102 MMHG | RESPIRATION RATE: 20 BRPM | OXYGEN SATURATION: 99 % | DIASTOLIC BLOOD PRESSURE: 66 MMHG | HEIGHT: 60 IN

## 2024-03-09 LAB
ANION GAP SERPL CALC-SCNC: 13 MMOL/L — SIGNIFICANT CHANGE UP (ref 5–17)
BASOPHILS # BLD AUTO: 0.05 K/UL — SIGNIFICANT CHANGE UP (ref 0–0.2)
BASOPHILS NFR BLD AUTO: 0.5 % — SIGNIFICANT CHANGE UP (ref 0–2)
BUN SERPL-MCNC: 12.9 MG/DL — SIGNIFICANT CHANGE UP (ref 8–20)
CALCIUM SERPL-MCNC: 9.2 MG/DL — SIGNIFICANT CHANGE UP (ref 8.4–10.5)
CHLORIDE SERPL-SCNC: 101 MMOL/L — SIGNIFICANT CHANGE UP (ref 96–108)
CO2 SERPL-SCNC: 23 MMOL/L — SIGNIFICANT CHANGE UP (ref 22–29)
CREAT SERPL-MCNC: 0.7 MG/DL — SIGNIFICANT CHANGE UP (ref 0.5–1.3)
EGFR: 116 ML/MIN/1.73M2 — SIGNIFICANT CHANGE UP
EOSINOPHIL # BLD AUTO: 0.32 K/UL — SIGNIFICANT CHANGE UP (ref 0–0.5)
EOSINOPHIL NFR BLD AUTO: 3.3 % — SIGNIFICANT CHANGE UP (ref 0–6)
GLUCOSE SERPL-MCNC: 96 MG/DL — SIGNIFICANT CHANGE UP (ref 70–99)
HCG SERPL-ACNC: <4 MIU/ML — SIGNIFICANT CHANGE UP
HCT VFR BLD CALC: 41.4 % — SIGNIFICANT CHANGE UP (ref 34.5–45)
HGB BLD-MCNC: 14.3 G/DL — SIGNIFICANT CHANGE UP (ref 11.5–15.5)
IMM GRANULOCYTES NFR BLD AUTO: 0.2 % — SIGNIFICANT CHANGE UP (ref 0–0.9)
LYMPHOCYTES # BLD AUTO: 3.14 K/UL — SIGNIFICANT CHANGE UP (ref 1–3.3)
LYMPHOCYTES # BLD AUTO: 32.4 % — SIGNIFICANT CHANGE UP (ref 13–44)
MCHC RBC-ENTMCNC: 31.3 PG — SIGNIFICANT CHANGE UP (ref 27–34)
MCHC RBC-ENTMCNC: 34.5 GM/DL — SIGNIFICANT CHANGE UP (ref 32–36)
MCV RBC AUTO: 90.6 FL — SIGNIFICANT CHANGE UP (ref 80–100)
MONOCYTES # BLD AUTO: 0.55 K/UL — SIGNIFICANT CHANGE UP (ref 0–0.9)
MONOCYTES NFR BLD AUTO: 5.7 % — SIGNIFICANT CHANGE UP (ref 2–14)
NEUTROPHILS # BLD AUTO: 5.6 K/UL — SIGNIFICANT CHANGE UP (ref 1.8–7.4)
NEUTROPHILS NFR BLD AUTO: 57.9 % — SIGNIFICANT CHANGE UP (ref 43–77)
PLATELET # BLD AUTO: 293 K/UL — SIGNIFICANT CHANGE UP (ref 150–400)
POTASSIUM SERPL-MCNC: 4.1 MMOL/L — SIGNIFICANT CHANGE UP (ref 3.5–5.3)
POTASSIUM SERPL-SCNC: 4.1 MMOL/L — SIGNIFICANT CHANGE UP (ref 3.5–5.3)
RBC # BLD: 4.57 M/UL — SIGNIFICANT CHANGE UP (ref 3.8–5.2)
RBC # FLD: 12.4 % — SIGNIFICANT CHANGE UP (ref 10.3–14.5)
SODIUM SERPL-SCNC: 137 MMOL/L — SIGNIFICANT CHANGE UP (ref 135–145)
WBC # BLD: 9.68 K/UL — SIGNIFICANT CHANGE UP (ref 3.8–10.5)
WBC # FLD AUTO: 9.68 K/UL — SIGNIFICANT CHANGE UP (ref 3.8–10.5)

## 2024-03-09 PROCEDURE — T1013: CPT

## 2024-03-09 PROCEDURE — 80048 BASIC METABOLIC PNL TOTAL CA: CPT

## 2024-03-09 PROCEDURE — 96374 THER/PROPH/DIAG INJ IV PUSH: CPT

## 2024-03-09 PROCEDURE — 99284 EMERGENCY DEPT VISIT MOD MDM: CPT

## 2024-03-09 PROCEDURE — 36415 COLL VENOUS BLD VENIPUNCTURE: CPT

## 2024-03-09 PROCEDURE — 85025 COMPLETE CBC W/AUTO DIFF WBC: CPT

## 2024-03-09 PROCEDURE — 84702 CHORIONIC GONADOTROPIN TEST: CPT

## 2024-03-09 PROCEDURE — 70450 CT HEAD/BRAIN W/O DYE: CPT | Mod: 26,MC

## 2024-03-09 PROCEDURE — 70450 CT HEAD/BRAIN W/O DYE: CPT | Mod: MC

## 2024-03-09 PROCEDURE — 99284 EMERGENCY DEPT VISIT MOD MDM: CPT | Mod: 25

## 2024-03-09 RX ORDER — METOCLOPRAMIDE HCL 10 MG
10 TABLET ORAL ONCE
Refills: 0 | Status: DISCONTINUED | OUTPATIENT
Start: 2024-03-09 | End: 2024-03-09

## 2024-03-09 RX ORDER — METOCLOPRAMIDE HCL 10 MG
10 TABLET ORAL ONCE
Refills: 0 | Status: COMPLETED | OUTPATIENT
Start: 2024-03-09 | End: 2024-03-09

## 2024-03-09 RX ORDER — SODIUM CHLORIDE 9 MG/ML
1000 INJECTION INTRAMUSCULAR; INTRAVENOUS; SUBCUTANEOUS ONCE
Refills: 0 | Status: COMPLETED | OUTPATIENT
Start: 2024-03-09 | End: 2024-03-09

## 2024-03-09 RX ADMIN — Medication 10 MILLIGRAM(S): at 20:54

## 2024-03-09 RX ADMIN — SODIUM CHLORIDE 1000 MILLILITER(S): 9 INJECTION INTRAMUSCULAR; INTRAVENOUS; SUBCUTANEOUS at 20:54

## 2024-03-09 RX ADMIN — Medication 2 TABLET(S): at 19:46

## 2024-03-09 NOTE — ED ADULT NURSE NOTE - OBJECTIVE STATEMENT
assumed pt care at this time RR even unlabored pt co 10/10 headache with nausea x3 days. states hasn't taken anything for the pain. Deneis other complaints at this time. Lights lowered for comfort.

## 2024-03-09 NOTE — ED ADULT TRIAGE NOTE - CHIEF COMPLAINT QUOTE
" I have pain in my head, nausea and blurry vision to both eyes for 3 days" pt states she has been taking medication with no relief

## 2024-03-09 NOTE — ED PROVIDER NOTE - PROGRESS NOTE DETAILS
Patient seen by Dr. valenzuela initially agreed with history physical exam and plan  - see the patient at the bedside initially felt still having a headache with some dizziness CAT scan explained to the patient that this is negative  Reports he IV fluids Reglan and recheck the basic labs. labs within normal limits now patient feeling a lot better headache subsided recommended follow-up with Dr. Posey she agreed   [place]

## 2024-03-09 NOTE — ED PROVIDER NOTE - NSFOLLOWUPINSTRUCTIONS_ED_ALL_ED_FT
Continúe con IBUPROFEN según lo prescrito. Tylenol según sea necesario para el dolor de cornelio seguido de la reacción.  : Seguimiento con grey neurólogo Dr. Posey  Dolor de cornelio    Un dolor de cornelio es un dolor o malestar que se siente alrededor de la cornelio o el atiya. Es posible que no se encuentre la causa específica de un dolor de cornelio, ya que existen muchos tipos, incluidos los franklyn de cornelio tensionales, las migrañas y los franklyn de cornelio en racimos. Observe grey condición para detectar cualquier cambio. Las cosas que puede hacer para controlar grey dolor incluyen enmanuel medicamentos de venta sadie y recetados según las instrucciones de grey proveedor de atención médica, acostarse en telma habitación oscura y tranquila, limitar el estrés, dormir con regularidad y abstenerse de consumir alcohol y productos de tabaco.    BUSQUE ATENCIÓN MÉDICA INMEDIATA SI TIENE ALGUNO DE LOS SIGUIENTES SÍNTOMAS: fiebre, vómitos, rigidez en el atiya, pérdida de la visión, problemas con el habla, debilidad muscular, pérdida del equilibrio, dificultad para caminar, desmayos o confusión.

## 2024-03-09 NOTE — ED ADULT NURSE NOTE - CHIEF COMPLAINT QUOTE
unknown " I have pain in my head, nausea and blurry vision to both eyes for 3 days" pt states she has been taking medication with no relief

## 2024-03-09 NOTE — ED PROVIDER NOTE - OBJECTIVE STATEMENT
33-year-old female past medical history headache followed by Dr. Posey neurology with headache x 3 days.  Patient presently taking her nortriptyline once a day and acetaminophen once a day for her headache.  Patient noted no relief of symptoms with her medications.  Patient denies any fever chills nausea or vomiting.  Patient notes some blurry vision.

## 2024-03-09 NOTE — ED PROVIDER NOTE - PATIENT PORTAL LINK FT
You can access the FollowMyHealth Patient Portal offered by [facility] by registering at the following website: [email or web address]. By joining [employer]’s FollowMyHealth portal, you will also be able to view your health information using other applications (apps) compatible with our system.

## 2024-08-30 ENCOUNTER — EMERGENCY (EMERGENCY)
Facility: HOSPITAL | Age: 34
LOS: 1 days | Discharge: HOME | End: 2024-08-30
Attending: STUDENT IN AN ORGANIZED HEALTH CARE EDUCATION/TRAINING PROGRAM
Payer: COMMERCIAL

## 2024-08-30 VITALS
WEIGHT: 149.25 LBS | DIASTOLIC BLOOD PRESSURE: 67 MMHG | OXYGEN SATURATION: 100 % | RESPIRATION RATE: 20 BRPM | SYSTOLIC BLOOD PRESSURE: 105 MMHG | HEART RATE: 80 BPM | TEMPERATURE: 98 F

## 2024-08-30 PROCEDURE — 99285 EMERGENCY DEPT VISIT HI MDM: CPT

## 2024-08-30 RX ORDER — ACETAMINOPHEN 325 MG/1
1000 TABLET ORAL ONCE
Refills: 0 | Status: COMPLETED | OUTPATIENT
Start: 2024-08-30 | End: 2024-08-30

## 2024-08-31 LAB
ALBUMIN SERPL ELPH-MCNC: 4.2 G/DL — SIGNIFICANT CHANGE UP (ref 3.3–5.2)
ALP SERPL-CCNC: 82 U/L — SIGNIFICANT CHANGE UP (ref 40–120)
ALT FLD-CCNC: 9 U/L — SIGNIFICANT CHANGE UP
ANION GAP SERPL CALC-SCNC: 13 MMOL/L — SIGNIFICANT CHANGE UP (ref 5–17)
APPEARANCE UR: CLEAR — SIGNIFICANT CHANGE UP
AST SERPL-CCNC: 15 U/L — SIGNIFICANT CHANGE UP
BACTERIA # UR AUTO: ABNORMAL /HPF
BASOPHILS # BLD AUTO: 0.06 K/UL — SIGNIFICANT CHANGE UP (ref 0–0.2)
BASOPHILS NFR BLD AUTO: 0.6 % — SIGNIFICANT CHANGE UP (ref 0–2)
BILIRUB SERPL-MCNC: 0.2 MG/DL — LOW (ref 0.4–2)
BILIRUB UR-MCNC: NEGATIVE — SIGNIFICANT CHANGE UP
BUN SERPL-MCNC: 12.2 MG/DL — SIGNIFICANT CHANGE UP (ref 8–20)
CALCIUM SERPL-MCNC: 8.7 MG/DL — SIGNIFICANT CHANGE UP (ref 8.4–10.5)
CAST: 0 /LPF — SIGNIFICANT CHANGE UP (ref 0–4)
CHLORIDE SERPL-SCNC: 104 MMOL/L — SIGNIFICANT CHANGE UP (ref 96–108)
CO2 SERPL-SCNC: 21 MMOL/L — LOW (ref 22–29)
COLOR SPEC: YELLOW — SIGNIFICANT CHANGE UP
CREAT SERPL-MCNC: 0.57 MG/DL — SIGNIFICANT CHANGE UP (ref 0.5–1.3)
DIFF PNL FLD: ABNORMAL
EGFR: 121 ML/MIN/1.73M2 — SIGNIFICANT CHANGE UP
EOSINOPHIL # BLD AUTO: 0.4 K/UL — SIGNIFICANT CHANGE UP (ref 0–0.5)
EOSINOPHIL NFR BLD AUTO: 3.9 % — SIGNIFICANT CHANGE UP (ref 0–6)
GLUCOSE SERPL-MCNC: 92 MG/DL — SIGNIFICANT CHANGE UP (ref 70–99)
GLUCOSE UR QL: NEGATIVE MG/DL — SIGNIFICANT CHANGE UP
HCG SERPL-ACNC: <4 MIU/ML — SIGNIFICANT CHANGE UP
HCT VFR BLD CALC: 38.1 % — SIGNIFICANT CHANGE UP (ref 34.5–45)
HGB BLD-MCNC: 12.9 G/DL — SIGNIFICANT CHANGE UP (ref 11.5–15.5)
IMM GRANULOCYTES NFR BLD AUTO: 0.2 % — SIGNIFICANT CHANGE UP (ref 0–0.9)
KETONES UR-MCNC: NEGATIVE MG/DL — SIGNIFICANT CHANGE UP
LEUKOCYTE ESTERASE UR-ACNC: ABNORMAL
LYMPHOCYTES # BLD AUTO: 3.75 K/UL — HIGH (ref 1–3.3)
LYMPHOCYTES # BLD AUTO: 36.1 % — SIGNIFICANT CHANGE UP (ref 13–44)
MCHC RBC-ENTMCNC: 30.5 PG — SIGNIFICANT CHANGE UP (ref 27–34)
MCHC RBC-ENTMCNC: 33.9 GM/DL — SIGNIFICANT CHANGE UP (ref 32–36)
MCV RBC AUTO: 90.1 FL — SIGNIFICANT CHANGE UP (ref 80–100)
MONOCYTES # BLD AUTO: 0.65 K/UL — SIGNIFICANT CHANGE UP (ref 0–0.9)
MONOCYTES NFR BLD AUTO: 6.3 % — SIGNIFICANT CHANGE UP (ref 2–14)
NEUTROPHILS # BLD AUTO: 5.5 K/UL — SIGNIFICANT CHANGE UP (ref 1.8–7.4)
NEUTROPHILS NFR BLD AUTO: 52.9 % — SIGNIFICANT CHANGE UP (ref 43–77)
NITRITE UR-MCNC: POSITIVE
PH UR: 7 — SIGNIFICANT CHANGE UP (ref 5–8)
PLATELET # BLD AUTO: 275 K/UL — SIGNIFICANT CHANGE UP (ref 150–400)
POTASSIUM SERPL-MCNC: 3.5 MMOL/L — SIGNIFICANT CHANGE UP (ref 3.5–5.3)
POTASSIUM SERPL-SCNC: 3.5 MMOL/L — SIGNIFICANT CHANGE UP (ref 3.5–5.3)
PROT SERPL-MCNC: 7.5 G/DL — SIGNIFICANT CHANGE UP (ref 6.6–8.7)
PROT UR-MCNC: NEGATIVE MG/DL — SIGNIFICANT CHANGE UP
RBC # BLD: 4.23 M/UL — SIGNIFICANT CHANGE UP (ref 3.8–5.2)
RBC # FLD: 12.1 % — SIGNIFICANT CHANGE UP (ref 10.3–14.5)
RBC CASTS # UR COMP ASSIST: 3 /HPF — SIGNIFICANT CHANGE UP (ref 0–4)
SODIUM SERPL-SCNC: 138 MMOL/L — SIGNIFICANT CHANGE UP (ref 135–145)
SP GR SPEC: 1.01 — SIGNIFICANT CHANGE UP (ref 1–1.03)
SQUAMOUS # UR AUTO: 0 /HPF — SIGNIFICANT CHANGE UP (ref 0–5)
UROBILINOGEN FLD QL: 1 MG/DL — SIGNIFICANT CHANGE UP (ref 0.2–1)
WBC # BLD: 10.38 K/UL — SIGNIFICANT CHANGE UP (ref 3.8–10.5)
WBC # FLD AUTO: 10.38 K/UL — SIGNIFICANT CHANGE UP (ref 3.8–10.5)
WBC UR QL: 6 /HPF — HIGH (ref 0–5)

## 2024-08-31 PROCEDURE — 87186 SC STD MICRODIL/AGAR DIL: CPT

## 2024-08-31 PROCEDURE — 84702 CHORIONIC GONADOTROPIN TEST: CPT

## 2024-08-31 PROCEDURE — 74177 CT ABD & PELVIS W/CONTRAST: CPT | Mod: 26,MC

## 2024-08-31 PROCEDURE — 76856 US EXAM PELVIC COMPLETE: CPT

## 2024-08-31 PROCEDURE — 85025 COMPLETE CBC W/AUTO DIFF WBC: CPT

## 2024-08-31 PROCEDURE — 76856 US EXAM PELVIC COMPLETE: CPT | Mod: 26

## 2024-08-31 PROCEDURE — 76830 TRANSVAGINAL US NON-OB: CPT | Mod: 26

## 2024-08-31 PROCEDURE — 96375 TX/PRO/DX INJ NEW DRUG ADDON: CPT | Mod: XU

## 2024-08-31 PROCEDURE — 96374 THER/PROPH/DIAG INJ IV PUSH: CPT | Mod: XU

## 2024-08-31 PROCEDURE — 87086 URINE CULTURE/COLONY COUNT: CPT

## 2024-08-31 PROCEDURE — 99284 EMERGENCY DEPT VISIT MOD MDM: CPT | Mod: 25

## 2024-08-31 PROCEDURE — 76830 TRANSVAGINAL US NON-OB: CPT

## 2024-08-31 PROCEDURE — 81001 URINALYSIS AUTO W/SCOPE: CPT

## 2024-08-31 PROCEDURE — 36415 COLL VENOUS BLD VENIPUNCTURE: CPT

## 2024-08-31 PROCEDURE — 80053 COMPREHEN METABOLIC PANEL: CPT

## 2024-08-31 PROCEDURE — 74177 CT ABD & PELVIS W/CONTRAST: CPT | Mod: MC

## 2024-08-31 RX ORDER — CEPHALEXIN 500 MG
1 CAPSULE ORAL
Qty: 14 | Refills: 0
Start: 2024-08-31 | End: 2024-09-06

## 2024-08-31 RX ADMIN — ACETAMINOPHEN 400 MILLIGRAM(S): 325 TABLET ORAL at 00:43

## 2024-08-31 RX ADMIN — Medication 1000 MILLIGRAM(S): at 04:31

## 2024-08-31 NOTE — ED PROVIDER NOTE - PATIENT PORTAL LINK FT
You can access the FollowMyHealth Patient Portal offered by [facility] by registering at the following website: [email or web address]. By joining [employer]’s FollowMyHealth portal, you will also be able to view your health information using other applications (apps) compatible with our system.

## 2024-08-31 NOTE — ED PROVIDER NOTE - PHYSICAL EXAMINATION
General: non-toxic appearing, in no acute distress, A+Ox3  CV: RRR, nl s1/s2.  Resp: CTAB, normal rate and effort  GI: Abdomen soft, NT, ND. Active bowel sounds. No rebound, no guarding  : No CVAT  Neuro: sensorimotor intact without deficits   Skin: No rashes, intact and perfused.

## 2024-08-31 NOTE — ED PROVIDER NOTE - AVIAN FLU WORK
Your home care company is [employer]: [phone]  Please contact [employer] for all questions related to supplies and your CPAP machine.   CPAP/BiPAP TIPS     Please be advised:   Do not drive while sleepy   Take CPAP/BiPAP machine to any procedure that requires sedation  When should I clean my machine & supplies?  DAILY  Wipe mask cushions or nasal pillow   Empty & rinse water chamber- refill with distilled water  WEEKLY  Clean mask cushions or nasal pillow, headgear, tubing, and humidifier chamber with mild soap (Caryl) and water   If water chamber has hard water buildup (white crust), soak in warm water & vinegar mix 50/50.  Rinse and hang dry    When should supplies be replaced?  Contact your home care company for replacement supplies, or if your machine is malfunctioning  *Below is a general guideline of what we recommend. Replacement of supplies differs depending on your insurance company*  MONTHLY: Replace filter and mask cushion  MONTHS: Replace headgear and tubing    Travel Tips  Keep CPAP/BiPAP in original bag when traveling, and place luggage tag on bag  Most airlines consider CPAP/BiPAP to be a medical device, therefore it is a free carry-on item  If unable to get distilled water, bottled water is safe while traveling. DO NOT use tap water  When traveling outside the [place] only a power adapter is necessary (CPAP can operate without a converter), bring an extension cord  Consider purchasing or renting a travel CPAP (not covered by insurance)  Dry Mouth/Nose  Turn up the humidity on your machine (select “Options” from the home screen)  Place a cool mist humidifier at your bedside  Over-the-counter remedies: Biotene, XyliMelts, NasoGel   Air Leak  Try adjusting your mask/headgear while laying in sleeping position vs. sitting up  Wash and dry your face prior to putting your mask on  If applicable: shave facial hair at night (or before wearing CPAP)  Purchase “RemZzzs” (through home  care co., cpap.com, or remzzzs.com)  100% cotton knit barrier that goes between your mask cushion and your skin  Replace your mask cushion (at least once per month) and/or headgear (every 3-6 months)  Nasal Congestion  CPAP therapy can cause nasal passages to dry out, & mucous membranes try to protect the nasal passages by producing excess mucous, so congestion results.  Over-the counter remedies: Flonase, Nasacort, Sinus Rinses (Neti-Pot), DO NOT USE Afrin  Try a mask that goes over the nose and mouth  Skin Irritation  Clean supplies regularly (Citrus II Mask Wipes, Control III disinfectant solution)  Try over the counter creams such as hydrocortisone 1% (apply in the morning after showering)  Your headgear may be too tight, replace supplies so you don't need to adjust so tightly  Try RemZzzs (100% cotton knit barrier that goes between your mask cushion and your skin)  Gas/Bloating  Try a different sleeping position to keep air out of the stomach. Lay on the left side or rotate to the right side. Incline with pillows or lay flat.  Over-the-counter remedies: Simethicone    No

## 2024-08-31 NOTE — ED ADULT NURSE NOTE - OBJECTIVE STATEMENT
Pt awake & alert, oriented x 4, presenting to the ED complaining of abdominal pain RLQ and lower back. Pt. states having difficulty urinating. Denies fevers/chills. Airway patent. Respirations even and unlabored. Denies chest pain & SOB. Pt safety maintained.

## 2024-08-31 NOTE — ED PROVIDER NOTE - OBJECTIVE STATEMENT
35yo F denies pmh presents to ED c/o progressive worsening HA x1d accompanied by RLQ pain and lower R back pain. pt also reports mild dysuria. lmp 1w ago. normal bm. pt denies fever, cough, nasal congestion, urinary freq/urgency, hematuria, NVD, CP, SOB, vaginal pain or DC, weakness, numbness, lightheadedness/dizziness

## 2024-08-31 NOTE — ED PROVIDER NOTE - CLINICAL SUMMARY MEDICAL DECISION MAKING FREE TEXT BOX
35yo F p/w HA, RLQ pain, and back pain. labs wnl, UA showed +UTI, pt received rocephin in ED, sent keflex to pharmacy. CT and US neg for acute findings. reviewed results with pt. advised to f.u with pcp. discussed supportive care measures and return precautions. pt verbalized understanding and agreement

## 2024-08-31 NOTE — ED PROVIDER NOTE - ATTENDING APP SHARED VISIT CONTRIBUTION OF CARE
I personally saw the patient with the ACP, and completed the key components of the history and physical exam, as well as the medical decision making. I then discussed the management plan with the ACP and ammended the documentation as indicated. My brief assessment is as follows:    35yo F presents with gradual onset of HA, RLQ pain, and back pain. labs wnl, UA showed +UTI, no clinical concern for pyelo. no focal neuro deficits, no concern for intracranial etiology at time of eval given gradual onset HA, normal neuro exam, and UTI. abx sent to [facility]. stable for discharge

## 2024-09-09 ENCOUNTER — APPOINTMENT (OUTPATIENT)
Dept: NEUROLOGY | Facility: CLINIC | Age: 34
End: 2024-09-09

## 2024-09-20 ENCOUNTER — EMERGENCY (EMERGENCY)
Facility: HOSPITAL | Age: 34
LOS: 1 days | Discharge: HOME | End: 2024-09-20
Attending: EMERGENCY MEDICINE
Payer: SELF-PAY

## 2024-09-20 VITALS
RESPIRATION RATE: 18 BRPM | OXYGEN SATURATION: 98 % | WEIGHT: 160.06 LBS | HEART RATE: 98 BPM | DIASTOLIC BLOOD PRESSURE: 70 MMHG | TEMPERATURE: 98 F | SYSTOLIC BLOOD PRESSURE: 106 MMHG | HEIGHT: 65 IN

## 2024-09-20 VITALS
RESPIRATION RATE: 18 BRPM | TEMPERATURE: 98 F | DIASTOLIC BLOOD PRESSURE: 63 MMHG | SYSTOLIC BLOOD PRESSURE: 97 MMHG | HEART RATE: 73 BPM | OXYGEN SATURATION: 99 %

## 2024-09-20 LAB — HCG UR QL: NEGATIVE — SIGNIFICANT CHANGE UP

## 2024-09-20 PROCEDURE — 71045 X-RAY EXAM CHEST 1 VIEW: CPT | Mod: 26

## 2024-09-20 PROCEDURE — 73030 X-RAY EXAM OF SHOULDER: CPT | Mod: 26,LT

## 2024-09-20 PROCEDURE — 81025 URINE PREGNANCY TEST: CPT

## 2024-09-20 PROCEDURE — 71045 X-RAY EXAM CHEST 1 VIEW: CPT

## 2024-09-20 PROCEDURE — 99284 EMERGENCY DEPT VISIT MOD MDM: CPT

## 2024-09-20 PROCEDURE — T1013: CPT

## 2024-09-20 PROCEDURE — 99284 EMERGENCY DEPT VISIT MOD MDM: CPT | Mod: 25

## 2024-09-20 PROCEDURE — 73030 X-RAY EXAM OF SHOULDER: CPT

## 2024-09-20 RX ORDER — ACETAMINOPHEN 325 MG/1
975 TABLET ORAL ONCE
Refills: 0 | Status: COMPLETED | OUTPATIENT
Start: 2024-09-20 | End: 2024-09-20

## 2024-09-20 RX ORDER — LIDOCAINE/BENZALKONIUM/ALCOHOL
1 SOLUTION, NON-ORAL TOPICAL
Qty: 1 | Refills: 0
Start: 2024-09-20 | End: 2024-09-24

## 2024-09-20 RX ORDER — METHOCARBAMOL 750 MG/1
1500 TABLET, FILM COATED ORAL ONCE
Refills: 0 | Status: COMPLETED | OUTPATIENT
Start: 2024-09-20 | End: 2024-09-20

## 2024-09-20 RX ORDER — LIDOCAINE/BENZALKONIUM/ALCOHOL
1 SOLUTION, NON-ORAL TOPICAL ONCE
Refills: 0 | Status: COMPLETED | OUTPATIENT
Start: 2024-09-20 | End: 2024-09-20

## 2024-09-20 RX ORDER — IBUPROFEN 600 MG
600 TABLET ORAL ONCE
Refills: 0 | Status: COMPLETED | OUTPATIENT
Start: 2024-09-20 | End: 2024-09-20

## 2024-09-20 RX ADMIN — ACETAMINOPHEN 975 MILLIGRAM(S): 325 TABLET ORAL at 15:13

## 2024-09-20 RX ADMIN — Medication 1 PATCH: at 16:34

## 2024-09-20 RX ADMIN — Medication 600 MILLIGRAM(S): at 15:13

## 2024-09-20 RX ADMIN — METHOCARBAMOL 1500 MILLIGRAM(S): 750 TABLET, FILM COATED ORAL at 16:36
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
## 2024-09-20 NOTE — ED ADULT TRIAGE NOTE - WEIGHT IN KG
Med rec updated and complete.  Allergies reviewed. Confirmed name and date of birth.  Pt reports no antibiotic use in last 30 days.  Pt and family report that she was on xarelto for 30 days. Started in July and finished around the first week of August.   Pt is currently taking a full strength  MG daily.    Home pharmacy    [facility] [phone]   72.6
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
## 2024-09-20 NOTE — ED PROVIDER NOTE - PATIENT PORTAL LINK FT
You can access the FollowMyHealth Patient Portal offered by [facility] by registering at the following website: [email or web address]. By joining [facility]’s FollowMyHealth portal, you will also be able to view your health information using other applications (apps) compatible with our system.

## 2024-09-20 NOTE — ED PROVIDER NOTE - NSFOLLOWUPINSTRUCTIONS_ED_ALL_ED_FT
1. Regrese al servicio de urgencias si los síntomas empeoran, son progresivos o cualquier otro síntoma preocupante  2. Ramirez un seguimiento con grey médico de atención primaria en 2-3 días.  3. Puede enmanuel tylenol e ibuprofeno para el dolor. [place] tylenol 650 mg cada 6 horas, no exceda los 4000 mg en 24 horas. [place] ibuprofeno 400 mg cada 6 horas, no exceda los 3200 mg en 24 horas. Puede alternar entre tylenol e ibuprofeno cada 3 horas.    Contusión    Telma contusión es un hematoma profundo. Las contusiones son el resultado de telma lesión contundente en los tejidos y las fibras musculares debajo de la piel. La piel que recubre la contusión puede volverse azar, chris o amarilla. Los síntomas también incluyen dolor e hinchazón en el área lesionada.    BUSQUE ATENCIÓN MÉDICA INMEDIATA SI TIENE ALGUNO DE LOS SIGUIENTES SÍNTOMAS: dolor severo, entumecimiento, hormigueo, dolor, debilidad o cambio de color / temperatura de la piel en cualquier parte del cuerpo distal a la lesión. 1. Regrese al servicio de urgencias si los síntomas empeoran, son progresivos o cualquier otro síntoma preocupante  2. Ramirez un seguimiento con grey médico de atención primaria en 2-3 días.  3. Puede enmanuel tylenol e ibuprofeno para el dolor. [place] tylenol 650 mg cada 6 horas, no exceda los 4000 mg en 24 horas. [place] ibuprofeno 400 mg cada 6 horas, no exceda los 3200 mg en 24 horas. Puede alternar entre tylenol e ibuprofeno cada 3 horas.  4. Puede aplicar el parche de lidocaína (enviado a grey farmacia) en el área dolorida. Mantener nishant 12 horas, apagado nisahnt 12 horas.    Contusión    Telma contusión es un hematoma profundo. Las contusiones son el resultado de telma lesión contundente en los tejidos y las fibras musculares debajo de la piel. La piel que recubre la contusión puede volverse azar, chris o amarilla. Los síntomas también incluyen dolor e hinchazón en el área lesionada.    BUSQUE ATENCIÓN MÉDICA INMEDIATA SI TIENE ALGUNO DE LOS SIGUIENTES SÍNTOMAS: dolor severo, entumecimiento, hormigueo, dolor, debilidad o cambio de color / temperatura de la piel en cualquier parte del cuerpo distal a la lesión.

## 2024-09-20 NOTE — ED PROVIDER NOTE - PHYSICAL EXAMINATION
Gen: NAD, AOx3  Head: NCAT, no scalp TTP  HEENT: EOMI, oral mucosa moist, normal conjunctiva, neck supple, +paracervical TTP  Lung: CTAB, no respiratory distress  CV: rrr, no murmur, Normal perfusion  Abd: soft, NT, ND. No guarding, no rigidity  MSK: No edema, no visible deformities. No midline CTL spine TTP. +L shoulder, L chest wall, L scapula TTP. +L lower back TTP. Motor/sensory intact all extremities. Distal pulses x 4  Neuro: No focal neurologic deficits. Normal gait.   Skin: No rash   Psych: normal affect

## 2024-09-20 NOTE — ED PROVIDER NOTE - PROGRESS NOTE DETAILS
XR negative for fractures. Pain improved with medications, FROM L shoulder however endorsing some pain there, lidocaine patch ordered and sent to patient pharmacy. Will discharge home with PCP follow up. Pt counseled on pain regimen at home.

## 2024-11-26 ENCOUNTER — APPOINTMENT (OUTPATIENT)
Dept: OBGYN | Facility: CLINIC | Age: 34
End: 2024-11-26
Payer: COMMERCIAL

## 2024-11-26 VITALS
DIASTOLIC BLOOD PRESSURE: 62 MMHG | SYSTOLIC BLOOD PRESSURE: 89 MMHG | HEIGHT: 61 IN | BODY MASS INDEX: 26.43 KG/M2 | WEIGHT: 140 LBS

## 2024-11-26 DIAGNOSIS — R32 UNSPECIFIED URINARY INCONTINENCE: ICD-10-CM

## 2024-11-26 DIAGNOSIS — Z01.419 ENCOUNTER FOR GYNECOLOGICAL EXAMINATION (GENERAL) (ROUTINE) W/OUT ABNORMAL FINDINGS: ICD-10-CM

## 2024-11-26 PROCEDURE — 99385 PREV VISIT NEW AGE 18-39: CPT

## 2024-11-26 PROCEDURE — 99459 PELVIC EXAMINATION: CPT

## 2024-11-29 LAB — HPV HIGH+LOW RISK DNA PNL CVX: NOT DETECTED

## 2024-12-02 LAB — CYTOLOGY CVX/VAG DOC THIN PREP: NORMAL
